# Patient Record
Sex: MALE | Race: WHITE | NOT HISPANIC OR LATINO | URBAN - NONMETROPOLITAN AREA
[De-identification: names, ages, dates, MRNs, and addresses within clinical notes are randomized per-mention and may not be internally consistent; named-entity substitution may affect disease eponyms.]

---

## 2017-01-01 ENCOUNTER — OFFICE VISIT - GICH (OUTPATIENT)
Dept: FAMILY MEDICINE | Facility: OTHER | Age: 77
End: 2017-01-01

## 2017-01-01 ENCOUNTER — COMMUNICATION - GICH (OUTPATIENT)
Dept: FAMILY MEDICINE | Facility: OTHER | Age: 77
End: 2017-01-01

## 2017-01-01 ENCOUNTER — COMMUNICATION - GICH (OUTPATIENT)
Dept: SURGERY | Facility: OTHER | Age: 77
End: 2017-01-01

## 2017-01-01 ENCOUNTER — HISTORY (OUTPATIENT)
Dept: FAMILY MEDICINE | Facility: OTHER | Age: 77
End: 2017-01-01

## 2017-01-01 ENCOUNTER — AMBULATORY - GICH (OUTPATIENT)
Dept: SCHEDULING | Facility: OTHER | Age: 77
End: 2017-01-01

## 2017-01-01 DIAGNOSIS — J44.1 CHRONIC OBSTRUCTIVE PULMONARY DISEASE WITH ACUTE EXACERBATION (H): ICD-10-CM

## 2017-01-01 DIAGNOSIS — F33.9 RECURRENT MAJOR DEPRESSIVE DISORDER (H): ICD-10-CM

## 2017-01-01 DIAGNOSIS — C34.91 MALIGNANT NEOPLASM OF UNSPECIFIED PART OF RIGHT BRONCHUS OR LUNG (H): ICD-10-CM

## 2017-01-01 DIAGNOSIS — J41.1 MUCOPURULENT CHRONIC BRONCHITIS (H): ICD-10-CM

## 2017-01-01 DIAGNOSIS — I10 ESSENTIAL (PRIMARY) HYPERTENSION: ICD-10-CM

## 2017-01-01 DIAGNOSIS — M25.50 PAIN IN JOINT: ICD-10-CM

## 2017-01-01 DIAGNOSIS — J44.9 CHRONIC OBSTRUCTIVE PULMONARY DISEASE (H): ICD-10-CM

## 2017-01-01 DIAGNOSIS — Z12.11 ENCOUNTER FOR SCREENING FOR MALIGNANT NEOPLASM OF COLON: ICD-10-CM

## 2017-01-01 LAB
A/G RATIO - HISTORICAL: 2.4 (ref 1–2)
ABSOLUTE BASOPHILS - HISTORICAL: 0 THOU/CU MM
ABSOLUTE EOSINOPHILS - HISTORICAL: 0.1 THOU/CU MM
ABSOLUTE IMMATURE GRANULOCYTES(METAS,MYELOS,PROS) - HISTORICAL: 0 THOU/CU MM
ABSOLUTE LYMPHOCYTES - HISTORICAL: 0.7 THOU/CU MM (ref 0.9–2.9)
ABSOLUTE MONOCYTES - HISTORICAL: 0.6 THOU/CU MM
ABSOLUTE NEUTROPHILS - HISTORICAL: 6.8 THOU/CU MM (ref 1.7–7)
ALBUMIN SERPL-MCNC: 4.4 G/DL (ref 3.5–5.7)
ALP SERPL-CCNC: 93 IU/L (ref 34–104)
ALT (SGPT) - HISTORICAL: 21 IU/L (ref 7–52)
ANION GAP - HISTORICAL: 18 (ref 5–18)
AST SERPL-CCNC: 23 IU/L (ref 13–39)
BASOPHILS # BLD AUTO: 0.5 %
BILIRUB SERPL-MCNC: 0.7 MG/DL (ref 0.3–1)
BUN SERPL-MCNC: 13 MG/DL (ref 7–25)
BUN/CREAT RATIO - HISTORICAL: 14
CALCIUM SERPL-MCNC: 9.4 MG/DL (ref 8.6–10.3)
CHLORIDE SERPLBLD-SCNC: 96 MMOL/L (ref 98–107)
CO2 SERPL-SCNC: 27 MMOL/L (ref 21–31)
CREAT SERPL-MCNC: 0.94 MG/DL (ref 0.7–1.3)
EOSINOPHIL NFR BLD AUTO: 1.3 %
ERYTHROCYTE [DISTWIDTH] IN BLOOD BY AUTOMATED COUNT: 12.2 % (ref 11.5–15.5)
GFR IF NOT AFRICAN AMERICAN - HISTORICAL: >60 ML/MIN/1.73M2
GLOBULIN - HISTORICAL: 1.8 G/DL (ref 2–3.7)
GLUCOSE SERPL-MCNC: 102 MG/DL (ref 70–105)
HCT VFR BLD AUTO: 46.7 % (ref 37–53)
HEMOGLOBIN: 16 G/DL (ref 13.5–17.5)
IMMATURE GRANULOCYTES(METAS,MYELOS,PROS) - HISTORICAL: 0.5 %
LYMPHOCYTES NFR BLD AUTO: 8.4 % (ref 20–44)
MCH RBC QN AUTO: 34.4 PG (ref 26–34)
MCHC RBC AUTO-ENTMCNC: 34.3 G/DL (ref 32–36)
MCV RBC AUTO: 100 FL (ref 80–100)
MONOCYTES NFR BLD AUTO: 7 %
NEUTROPHILS NFR BLD AUTO: 82.3 % (ref 42–72)
PLATELET # BLD AUTO: 201 THOU/CU MM (ref 140–440)
PMV BLD: 8.9 FL (ref 6.5–11)
POTASSIUM SERPL-SCNC: 4.5 MMOL/L (ref 3.5–5.1)
PROT SERPL-MCNC: 6.2 G/DL (ref 6.4–8.9)
RED BLOOD COUNT - HISTORICAL: 4.65 MIL/CU MM (ref 4.3–5.9)
SODIUM SERPL-SCNC: 141 MMOL/L (ref 133–143)
WHITE BLOOD COUNT - HISTORICAL: 8.2 THOU/CU MM (ref 4.5–11)

## 2017-01-01 ASSESSMENT — PATIENT HEALTH QUESTIONNAIRE - PHQ9
SUM OF ALL RESPONSES TO PHQ QUESTIONS 1-9: 0
SUM OF ALL RESPONSES TO PHQ QUESTIONS 1-9: 0

## 2017-01-10 ENCOUNTER — COMMUNICATION - GICH (OUTPATIENT)
Dept: FAMILY MEDICINE | Facility: OTHER | Age: 77
End: 2017-01-10

## 2017-01-10 DIAGNOSIS — J44.9 CHRONIC OBSTRUCTIVE PULMONARY DISEASE (H): ICD-10-CM

## 2017-01-10 DIAGNOSIS — I10 ESSENTIAL (PRIMARY) HYPERTENSION: ICD-10-CM

## 2017-01-10 DIAGNOSIS — M25.50 PAIN IN JOINT: ICD-10-CM

## 2017-01-10 DIAGNOSIS — F33.9 RECURRENT MAJOR DEPRESSIVE DISORDER (H): ICD-10-CM

## 2017-01-11 ENCOUNTER — HOSPITAL ENCOUNTER (OUTPATIENT)
Dept: RADIOLOGY | Facility: OTHER | Age: 77
End: 2017-01-11
Attending: FAMILY MEDICINE

## 2017-01-11 ENCOUNTER — OFFICE VISIT - GICH (OUTPATIENT)
Dept: FAMILY MEDICINE | Facility: OTHER | Age: 77
End: 2017-01-11

## 2017-01-11 ENCOUNTER — HISTORY (OUTPATIENT)
Dept: FAMILY MEDICINE | Facility: OTHER | Age: 77
End: 2017-01-11

## 2017-01-11 DIAGNOSIS — R19.4 CHANGE IN BOWEL HABITS: ICD-10-CM

## 2017-01-23 ENCOUNTER — AMBULATORY - GICH (OUTPATIENT)
Dept: SCHEDULING | Facility: OTHER | Age: 77
End: 2017-01-23

## 2017-01-27 ENCOUNTER — COMMUNICATION - GICH (OUTPATIENT)
Dept: FAMILY MEDICINE | Facility: OTHER | Age: 77
End: 2017-01-27

## 2017-01-27 DIAGNOSIS — J44.9 CHRONIC OBSTRUCTIVE PULMONARY DISEASE (H): ICD-10-CM

## 2017-01-31 ENCOUNTER — AMBULATORY - GICH (OUTPATIENT)
Dept: SCHEDULING | Facility: OTHER | Age: 77
End: 2017-01-31

## 2017-01-31 ENCOUNTER — AMBULATORY - GICH (OUTPATIENT)
Dept: RADIOLOGY | Facility: OTHER | Age: 77
End: 2017-01-31

## 2017-02-02 ENCOUNTER — AMBULATORY - GICH (OUTPATIENT)
Dept: SCHEDULING | Facility: OTHER | Age: 77
End: 2017-02-02

## 2017-02-02 DIAGNOSIS — C34.91 NON-SMALL CELL LUNG CANCER, RIGHT (H): Primary | ICD-10-CM

## 2017-02-06 ENCOUNTER — COMMUNICATION - GICH (OUTPATIENT)
Dept: FAMILY MEDICINE | Facility: OTHER | Age: 77
End: 2017-02-06

## 2017-02-21 ENCOUNTER — AMBULATORY - GICH (OUTPATIENT)
Dept: RADIOLOGY | Facility: OTHER | Age: 77
End: 2017-02-21

## 2017-02-21 DIAGNOSIS — C34.91 MALIGNANT NEOPLASM OF UNSPECIFIED PART OF RIGHT BRONCHUS OR LUNG (H): ICD-10-CM

## 2017-03-01 ENCOUNTER — HOSPITAL ENCOUNTER (OUTPATIENT)
Dept: RADIOLOGY | Facility: OTHER | Age: 77
End: 2017-03-01

## 2017-03-01 DIAGNOSIS — C34.91 MALIGNANT NEOPLASM OF UNSPECIFIED PART OF RIGHT BRONCHUS OR LUNG (H): ICD-10-CM

## 2017-03-06 ENCOUNTER — AMBULATORY - GICH (OUTPATIENT)
Dept: SCHEDULING | Facility: OTHER | Age: 77
End: 2017-03-06

## 2017-03-15 ENCOUNTER — AMBULATORY - GICH (OUTPATIENT)
Dept: SCHEDULING | Facility: OTHER | Age: 77
End: 2017-03-15

## 2017-12-27 NOTE — PROGRESS NOTES
Patient Information     Patient Name MRN Sex Yuval Isaacs [id number] Male 1940      Progress Notes by Eddie Torres MD at 10/10/2017 12:45 PM     Author:  Eddie Torres MD Service:  (none) Author Type:  Physician     Filed:  10/10/2017  1:04 PM Encounter Date:  10/10/2017 Status:  Signed     :  Eddie Torres MD (Physician)            Nursing Notes:   Mallory Pate  10/10/2017 12:49 PM  Signed  Patient presents to the clinic to get some medications refilled.  Mallory MATY Rio LPN....................10/10/2017 12:42 PM    Yuval Law is a 77 y.o. male who presents for   Chief Complaint     Patient presents with       Medication Management      Refills     HPI: Mr. Law comes in to review his medications; hehas been changing his own BP medications as his BP was symptomatically low in the  range systolic. Now today it is too high. We discussed options and he will stop diltizem totally and resume HYRDROCHLOROTHIAZIDE. He will be cautious about driving , etc. He will watch it closely on home monitor. His COPD is severe requiring OXYGEN with activity and at night. Flovent is used and helps as do proair as needed and spiriva. He needs the proair every day- several times. He has lung cancer as well as COPD.. He is followed in [place] for the cancer. He feels the fluoxetine is helpful and would like to stay on it.   Past Medical History:     Diagnosis  Date     Alcohol use 2015    no withdrawal history      Benign non-nodular prostatic hyperplasia with lower urinary tract symptoms 3/11/2016     Calculus of gallbladder 11/15/2016    S/p CT chest 11/15/2016: Numerous radiodense gallstones.      CAP (community acquired pneumonia) 2016    hosp'd again 2016      CAP (community acquired pneumonia) 2016     COPD (chronic obstructive pulmonary disease) (HC)      Diverticulosis of colon (without mention of hemorrhage)      Glaucoma stage, unspecified(365.70)      Hx  of flexible sigmoidoscopy 02    polyp      Hyperplastic colon polyp     descending colon at 50 cm. and at 25 cm.      Injury of fifth finger of right hand     Table saw injury, repaired by Dr. Stringer      Knee injury     Injured left knee,in  service      Major depressive disorder, recurrent episode, severe, without mention of psychotic behavior      MI, old     15 years ago, slight, per pt      Obesity, unspecified      Pelvic fracture (HC)     3 years ago, slipped on ice      Pulmonary nodule, right 11/15/2016    S/p CT chest 11/15/2016: 2. 2.5 cm pleural-based nodule in the right costophrenic angle sulcus is strongly concerning for neoplasm. PET/CT evaluation may be of benefit in assessing for hypermetabolic activity. The nodule also appears to be approachable for CT-guided biopsy.      Pure hypercholesterolemia      Sebaceous cyst      small umbilical hernia 3/13/2013     Tobacco abuse 7    smoked 1-2 ppd; quit at age 58      Unspecified essential hypertension      Past Surgical History:      Procedure  Laterality Date     CATARACT EXTRACTION Left 8/19/15    Dr Andrade       COLONOSCOPY SCREENING    and07    next due in        hand surgeyr Right     after saw accident       HX VITRECTOMY with Membrane Peel Left 4/10/2014    Dr. Mix       [place] LASER SURGERY OF EYE      for glaucoma       skin cancer surgery      on face, 5 yrs ago       TONSILLECTOMY       Family History       Problem   Relation Age of Onset     Cancer  Mother       of lung cancer at age 84.       Other  Father       in World War II at age 45       Cancer  Brother       of lung cancer       Cancer  Brother       of lung cancer       Current Outpatient Prescriptions       Medication  Sig Dispense Refill     albuterol (PROVENTIL) 0.083 % neb solution Inhale 3 mL via a nebulizer every 4 hours if needed. 1650 mL 3     albuterol HFA (PROAIR HFA) 90 mcg/actuation inhaler Inhale 1-2 Puffs by mouth  "every 4 hours while awake. 3 Inhaler 3     albuterol HFA 90 mcg/actuation inhaler Inhale 1-2 Puffs by mouth every 4 hours if needed (Shortness of Breath or Wheezing).       cholecalciferol (VITAMIN D) 1,000 unit tablet Take 1,000 Units by mouth at bedtime.       diltiazem (CARDIZEM) 120 mg tablet Take 1 tablet by mouth 2 times daily. 180 tablet 3     FLUoxetine (SARAFEM) 20 mg tablet Take 1 tablet by mouth every morning. 90 tablet 0     fluticasone (FLOVENT HFA) 220 mcg/Actuation inhaler Inhale 1 Puff by mouth 2 times daily. 3 Inhaler 4     Gluco Sulfate NaCl-Chond Hamlin Na (GLUCOSAMINE-CHONDROITIN 3X) 750-600 mg tab Take 1 Tab by mouth 2 times daily.       guaiFENesin 400 mg tablet Take 400 mg by mouth 2 times daily.       hydroCHLOROthiazide (HCTZ) 25 mg tablet Take 1 tablet by mouth once daily. 90 tablet 3     meloxicam 15 mg tablet Take 1 tablet by mouth once daily. 90 tablet 3     Nebulizer Accessories SideStream mouthpiece 1 Kit 0     Nebulizer Nebulizer, PORTABLE, neb kit, neb cup and mask.  Medication: albuterol  For home use. Length of need  for Medicare patients: 99 1 Device 0     tiotropium (SPIRIVA) 18 mcg inhalation capsule Inhale 18 mcg by mouth once daily. 90 capsule 3     No current facility-administered medications for this visit.      Medications have been reviewed by me and are current to the best of my knowledge and ability.    Allergies      Allergen   Reactions     Morphine  Emotional Disturbance     Paranoid, \"goofy\" per patient report      Naproxen  Headache     Penicillin G Benzathin,Procain  Hives and Rash     Budesonide-Formoterol  Cough     Cialis [Tadalafil]  Other - Describe In Comment Field     Didn't feel well      Flomax [Tamsulosin]  Other - Describe In Comment Field     Weakness, nasal/head/chest congestion       Levofloxacin  Edema and Ruptured Tendon     Ligament problems      Lisinopril  Hives     Sulfa (Sulfonamide Antibiotics)  *Unknown - Pt Doesn't Remember     Sulindac  " "*Unknown - Pt Doesn't Remember     Tramadol  Insomnia     Mometasone-Formoterol  Intolerance-Can't Take     Throat irritation      Sulfamethoxazole  *Unknown     Patient does not remember          EXAM:   Vitals:        10/10/17 1238 10/10/17 1243 10/10/17 1246 10/10/17 1250   BP:  158/92 160/84    Pulse:  92     SpO2: 93% 97%  (!) 89%   Weight:  101.2 kg (223 lb 3.2 oz)     Height:  1.83 m (6' 0.05\")       General Appearance: Pleasant, alert, appropriate appearance for age. No acute distress  Chest/Respiratory Exam: distant sounds  Cardiovascular Exam: Regular rate and rhythm. S1, S2, no murmur, click, gallop, or rubs. 100 heart rate   ASSESSMENT AND PLAN:  1. HTN (hypertension)  Will stop diltizemand resume HCTZ  - diltiazem (CARDIZEM) 120 mg tablet; Take 1 tablet by mouth 2 times daily.  Dispense: 180 tablet; Refill: 3  - fluticasone (FLOVENT HFA) 220 mcg/Actuation inhaler; Inhale 1 Puff by mouth 2 times daily.  Dispense: 3 Inhaler; Refill: 4  - hydroCHLOROthiazide (HCTZ) 25 mg tablet; Take 1 tablet by mouth once daily.  Dispense: 90 tablet; Refill: 3    2. Episode of recurrent major depressive disorder, unspecified depression episode severity (HC)  stable  - FLUoxetine (SARAFEM) 20 mg tablet; Take 1 tablet by mouth every morning.  Dispense: 90 tablet; Refill: 0    3. Arthralgia, unspecified joint  stable  - meloxicam 15 mg tablet; Take 1 tablet by mouth once daily.  Dispense: 90 tablet; Refill: 3    4. Chronic obstructive pulmonary disease, unspecified COPD type (HC)  Needs OXYGEN for ambulation and at bedtime  - albuterol (PROVENTIL) 0.083 % neb solution; Inhale 3 mL via a nebulizer every 4 hours if needed.  Dispense: 1650 mL; Refill: 3  - albuterol HFA (PROAIR HFA) 90 mcg/actuation inhaler; Inhale 1-2 Puffs by mouth every 4 hours while awake.  Dispense: 3 Inhaler; Refill: 3  - tiotropium (SPIRIVA) 18 mcg inhalation capsule; Inhale 18 mcg by mouth once daily.  Dispense: 90 capsule; Refill: 3                 "

## 2017-12-28 NOTE — TELEPHONE ENCOUNTER
Patient Information     Patient Name MRN Sex Yuval Isaacs [id number] Male 1940      Telephone Encounter by Nuria Jefferson RN at 2017  3:16 PM     Author:  Nuria Jefferson RN Service:  (none) Author Type:  NURS- Registered Nurse     Filed:  2017  3:17 PM Encounter Date:  2017 Status:  Signed     :  Nuria Jefferson RN (NURS- Registered Nurse)            Diuretics (may be prescribed for edema)    Office visit in the past 12 months or per provider note.    Last visit with INOCENCIA ALVAREZ was on: 2017 in [facility]  Next visit with INOCENCIA ALVAREZ is on: No future appointment listed with this provider  Next visit with [facility] is on: No future appointment listed in this department    Lab testing requirements:  Creatinine and Potassium annually, if ordering lab, order BMP.  CREATININE (mg/dL)    Date Value   2016 0.78     POTASSIUM (mmol/L)    Date Value   2016 3.6     BP Readings from Last 4 Encounters:    17 142/74   17 150/82   16 160/92   16 136/60         Review last provider visit note.  If BP reviewed and plan is noted, can refill.  Max refill for 12 months from last office visit or per provider note.  Bronchodilator Inhalers     Office visit in the past 12 months.    Last visit with INOCENCIA ALVAREZ was on: 2017 in [facility]  Next visit with INOCENCIA ALVAREZ is on: No future appointment listed with this provider  Next visit with [facility] is on: No future appointment listed in this department    Max refills 12 months from last office visit.  Prescription refilled per RN Medication Refill Policy.................... NURIA JEFFERSON RN ....................  2017   3:16 PM

## 2017-12-28 NOTE — TELEPHONE ENCOUNTER
Patient Information     Patient Name MRN Sex Yuval Isaacs [id number] Male 1940      Telephone Encounter by Sherrie Og at 2017  1:43 PM     Author:  Sherrie Og Service:  (none) Author Type:  (none)     Filed:  2017  1:44 PM Encounter Date:  2017 Status:  Signed     :  Shrerie Og            Please call patient regarding oxygen concentrator. They need a Provider to authorize medical necessity.

## 2017-12-28 NOTE — ADDENDUM NOTE
Patient Information     Patient Name MRN Yuval Alva [id number] Male 1940      Addendum Note by Eddie Torres MD at 10/10/2017  1:07 PM     Author:  Eddie Torres MD Service:  (none) Author Type:  Physician     Filed:  10/10/2017  1:07 PM Encounter Date:  10/10/2017 Status:  Signed     :  Eddie Torres MD (Physician)       Addended by: EDDIE TORRES on: 10/10/2017 01:07 PM        Modules accepted: Orders

## 2017-12-28 NOTE — TELEPHONE ENCOUNTER
Patient Information     Patient Name MRN Sex Yuval Isaacs [id number] Male 1940      Telephone Encounter by Wanda Pierre RN at 2017  3:19 PM     Author:  Wanda Pierre RN Service:  (none) Author Type:  NURS- Registered Nurse     Filed:  2017  3:28 PM Encounter Date:  2017 Status:  Signed     :  Wanda Pierre RN (NURS- Registered Nurse)            Depression-in adults 18 and over  SSRI    Office visit in the past 12 months or as indicated in chart.  Should have clinic visit 1-2 months after initial prescription.    Last visit with [facility]ENCIA ALVAREZ was on: 2017 in [facility]  Next visit with INOCENCIA ALVAREZ is on: No future appointment listed with this provider  Next visit with Family Practice is on: No future appointment listed in this department    Max refills 12 months from last office visit or per providers notes.    PHQ Depression Screening 2017   Date of PHQ exam (doc flow) 2017   1. Lack of interest/pleasure 0 - Not at all 0 - Not at all   2. Feeling down/depressed 0 - Not at all 0 - Not at all   PHQ-2 TOTAL SCORE 0 0   3. Trouble sleeping - 0 - Not at all   4. Decreased energy - 0 - Not at all   5. Appetite change - 0 - Not at all   6. Feelings of failure - 0 - Not at all   7. Trouble concentrating - 0 - Not at all   8. Activity level - 0 - Not at all   9. Hurting yourself - 0 - Not at all   PHQ-9 TOTAL SCORE - 0   PHQ-9 Severity Level - none   Functional Impairment - -   Some recent data might be hidden          Nsaids    Office visit in the past 12 months or per provider note.    Max refill for 12 months from last office visit or per provider note.    Asthma/COPD    Office visit in the past 12 months.    Max refills 12 months from last office visit.    Calcium Channel Blockers    Office visit in the past 12 months or per provider note.      Review last provider visit note.  If BP reviewed and plan is noted,  can refill.  Max refill for 12 months from last office visit or per provider note.    BP last addressed 11/2016. Patient is due for medication management appointment. Limited refill provided at this time. Tradyo message and/or letter sent for reminder to patient. Prescription refilled per RN Medication Refill Policy.................... Wanda Pierre RN ....................  9/13/2017   3:26 PM

## 2017-12-28 NOTE — TELEPHONE ENCOUNTER
Patient Information     Patient Name MRN Sex Yuval Isaacs [id number] Male 1940      Telephone Encounter by Laisha Mcdaniel at 2017  8:35 AM     Author:  Laisha Mcdaniel Service:  (none) Author Type:  (none)     Filed:  2017  8:43 AM Encounter Date:  2017 Status:  Signed     :  Laisha Mcdaniel            Patient is coming in to be seen next week for Px, as he has not had one for over a year. The oxygen concentrator will be addressed at that time.   Laisha Mcdaniel LPN..............2017 8:43 AM

## 2017-12-28 NOTE — TELEPHONE ENCOUNTER
Patient Information     Patient Name MRN Sex Yuval Isaacs [id number] Male 1940      Telephone Encounter by Laisha Mcdaniel at 2017  9:41 AM     Author:  Laisha Mcdaniel Service:  (none) Author Type:  (none)     Filed:  2017  9:42 AM Encounter Date:  2017 Status:  Signed     :  Laisha Mcdaniel            Patient notified and transferred to scheduling to set up an appointment..  Laisha Mcdaniel LPN..............2017 9:42 AM

## 2017-12-28 NOTE — TELEPHONE ENCOUNTER
Patient Information     Patient Name MRN Yuval Alva [id number] Male 1940      Telephone Encounter by Iglesia Byrne MD at 2017  4:30 PM     Author:  Iglesia Byrne MD Service:  (none) Author Type:  Physician     Filed:  2017  4:30 PM Encounter Date:  2017 Status:  Signed     :  Iglesia Byrne MD (Physician)            Needs to be seen for this.  Iglesia Byrne MD ....................  2017   4:30 PM

## 2017-12-28 NOTE — TELEPHONE ENCOUNTER
Patient Information     Patient Name MRN Sex Yuval Isaacs [id number] Male 1940      Telephone Encounter by Laisha Mcdaniel at 2017  4:06 PM     Author:  Laisha Mcdaniel Service:  (none) Author Type:  (none)     Filed:  2017  4:14 PM Encounter Date:  2017 Status:  Signed     :  Laisha Mcdaniel            Patient is wanting to get a portable oxygen device, and he needs a doctor for the order. He is trying to get this through [facility]. Does he need to be seen? Or is this something that can be ordered, or can even get it?  Laisha Mcdaniel LPN..............2017 4:13 PM

## 2017-12-28 NOTE — PROGRESS NOTES
"Patient Information     Patient Name MRN Sex Yuval Isaacs [id number] Male 1940      Progress Notes by Jeffery De Paz MD at 2017  1:30 PM     Author:  Jeffery De Paz MD Service:  (none) Author Type:  Physician     Filed:  2017  9:01 AM Encounter Date:  2017 Status:  Signed     :  Jeffery De Paz MD (Physician)            SUBJECTIVE:    Yuval Law is a 77 y.o. male who presents for discuss colonoscopy    HPI Comments: Patient arrives here to discuss colonoscopy. He recently received a form letter indicating that he should have his colonoscopy scheduled. Patient recently was diagnosed with lung cancer. He did have the CyberKnife done for his lung cancer. He's doing well. He has non-small cell. Has questions whether he should even proceed with colonoscopy. His last colonoscopy was normal.      Allergies      Allergen   Reactions     Morphine  Emotional Disturbance     Paranoid, \"goofy\" per patient report      Naproxen  Headache     Penicillin G Benzathin,Procain  Hives and Rash     Budesonide-Formoterol  Cough     Cialis [Tadalafil]  Other - Describe In Comment Field     Didn't feel well      Flomax [Tamsulosin]  Other - Describe In Comment Field     Weakness, nasal/head/chest congestion       Levofloxacin  Edema and Ruptured Tendon     Ligament problems      Lisinopril  Hives     Sulfa (Sulfonamide Antibiotics)  *Unknown - Pt Doesn't Remember     Sulindac  *Unknown - Pt Doesn't Remember     Tramadol  Insomnia     Mometasone-Formoterol  Intolerance-Can't Take     Throat irritation      Sulfamethoxazole  *Unknown     Patient does not remember    ,   Family History       Problem   Relation Age of Onset     Cancer  Mother       of lung cancer at age 84.       Other  Father       in World War II at age 45       Cancer  Brother       of lung cancer       Cancer  Brother       of lung cancer     ,   Current Outpatient Prescriptions on File Prior to Visit     "   Medication  Sig Dispense Refill     albuterol (PROVENTIL) 0.083 % neb solution Inhale 3 mL via a nebulizer every 4 hours if needed. 1650 mL 3     albuterol HFA 90 mcg/actuation inhaler Inhale 1-2 Puffs by mouth every 4 hours if needed (Shortness of Breath or Wheezing).       cholecalciferol (VITAMIN D) 1,000 unit tablet Take 1,000 Units by mouth at bedtime.       diltiazem (CARDIZEM) 120 mg tablet Take 1 tablet by mouth two  times daily 180 tablet 2     FLUoxetine (SARAFEM) 20 mg tablet Take 1 tablet by mouth  every morning 90 tablet 2     fluticasone (FLOVENT HFA) 220 mcg/Actuation inhaler Inhale 1 Puff by mouth 2 times daily. 3 Inhaler 4     Gluco Sulfate NaCl-Chond Hamlin Na (GLUCOSAMINE-CHONDROITIN 3X) 750-600 mg tab Take 1 Tab by mouth 2 times daily.       guaiFENesin 400 mg tablet Take 400 mg by mouth 2 times daily.       hydroCHLOROthiazide (HCTZ) 25 mg tablet Take 1 tablet by mouth once daily 90 tablet 1     meloxicam 15 mg tablet Take 1 tablet by mouth once daily 90 tablet 2     Nebulizer Accessories SideStream mouthpiece 1 Kit 0     Nebulizer Nebulizer, PORTABLE, neb kit, neb cup and mask.  Medication: albuterol  For home use. Length of need  for Medicare patients: 99 1 Device 0     PROAIR HFA 90 mcg/actuation inhaler Use 1 to 2 puffs every 4  hours as needed 3 Inhaler 1     SPIRIVA 18 mcg inhalation capsule Inhale the contents of 1  capsule via HandiHaler  daily 90 capsule 2     No current facility-administered medications on file prior to visit.    ,   Past Surgical History:      Procedure  Laterality Date     CATARACT EXTRACTION Left 8/19/15    Dr Andrade       COLONOSCOPY SCREENING   2002 and04/16/07    next due in 2017       hand surgeyr Right     after saw accident       HX VITRECTOMY with Membrane Peel Left 4/10/2014    Dr. Mix       [place] LASER SURGERY OF EYE      for glaucoma       skin cancer surgery      on face, 5 yrs ago       TONSILLECTOMY     ,   Social History        Substance Use Topics           Smoking status:   Former Smoker      Packs/day:  2.00      Types:  Cigarettes      Quit date:  1/1/1999      Smokeless tobacco:   Current User      Types:  Chew      Alcohol use   Yes      Comment: occasional       and   Social History        Substance Use Topics          Smoking status:   Former Smoker      Packs/day:  2.00      Types:  Cigarettes      Quit date:  1/1/1999      Smokeless tobacco:   Current User      Types:  Chew      Alcohol use   Yes      Comment: occasional          REVIEW OF SYSTEMS:  ROS    OBJECTIVE:  /88  Pulse 76  Wt 104.4 kg (230 lb 3.2 oz)  SpO2 96%  BMI 31.22 kg/m2    EXAM:   Physical Exam   Constitutional: He is well-developed, well-nourished, and in no distress.   Pulmonary/Chest: Effort normal and breath sounds normal.   Neurological: He is alert.   Psychiatric: Mood and affect normal.       ASSESSMENT/PLAN:    ICD-10-CM    1. Screening for colon cancer Z12.11         Plan:  I discussed with general surgery. Because of his age and his lung cancer and previous normal colonoscopy patient does not need to proceed with colonoscopy at this time. His currently asymptomatic and not having any stool changes. This was relayed to the patient and he is agreeable. Follow-up when necessary.

## 2017-12-30 NOTE — NURSING NOTE
Patient Information     Patient Name MRN Sex Yuval Isaacs [id number] Male 1940      Nursing Note by Mallory Pate at 10/10/2017 12:45 PM     Author:  Mallory Pate Service:  (none) Author Type:  (none)     Filed:  10/10/2017 12:49 PM Encounter Date:  10/10/2017 Status:  Signed     :  Mallory Pate            Patient presents to the clinic to get some medications refilled.  Mallory Pate LPN....................10/10/2017 12:42 PM

## 2017-12-30 NOTE — NURSING NOTE
Patient Information     Patient Name MRN Sex Yuval Isaacs [id number] Male 1940      Nursing Note by Jing Tracey at 2017  1:30 PM     Author:  Jing Tracey Service:  (none) Author Type:  (none)     Filed:  2017  1:34 PM Encounter Date:  2017 Status:  Signed     :  Jing Tracey            Patient here for consult for colonoscopy. Jing Tracey LPN .......................2017  1:29 PM

## 2018-01-01 ENCOUNTER — TELEPHONE (OUTPATIENT)
Dept: FAMILY MEDICINE | Facility: OTHER | Age: 78
End: 2018-01-01

## 2018-01-01 ENCOUNTER — HOSPITAL ENCOUNTER (EMERGENCY)
Facility: OTHER | Age: 78
Discharge: HOME | End: 2018-03-06
Attending: FAMILY MEDICINE | Admitting: FAMILY MEDICINE
Payer: MEDICARE

## 2018-01-01 ENCOUNTER — DOCUMENTATION ONLY (OUTPATIENT)
Dept: FAMILY MEDICINE | Facility: OTHER | Age: 78
End: 2018-01-01

## 2018-01-01 ENCOUNTER — OFFICE VISIT (OUTPATIENT)
Dept: FAMILY MEDICINE | Facility: OTHER | Age: 78
End: 2018-01-01
Attending: FAMILY MEDICINE
Payer: MEDICARE

## 2018-01-01 ENCOUNTER — HOSPITAL ENCOUNTER (OUTPATIENT)
Dept: GENERAL RADIOLOGY | Facility: OTHER | Age: 78
Discharge: HOME | End: 2018-03-01
Attending: FAMILY MEDICINE | Admitting: FAMILY MEDICINE
Payer: MEDICARE

## 2018-01-01 ENCOUNTER — HISTORY (OUTPATIENT)
Dept: FAMILY MEDICINE | Facility: OTHER | Age: 78
End: 2018-01-01

## 2018-01-01 ENCOUNTER — HOSPITAL ENCOUNTER (OUTPATIENT)
Dept: RADIOLOGY | Facility: OTHER | Age: 78
End: 2018-02-01
Attending: FAMILY MEDICINE

## 2018-01-01 ENCOUNTER — APPOINTMENT (OUTPATIENT)
Dept: GENERAL RADIOLOGY | Facility: OTHER | Age: 78
DRG: 189 | End: 2018-01-01
Attending: EMERGENCY MEDICINE
Payer: MEDICARE

## 2018-01-01 ENCOUNTER — OFFICE VISIT - GICH (OUTPATIENT)
Dept: FAMILY MEDICINE | Facility: OTHER | Age: 78
End: 2018-01-01

## 2018-01-01 ENCOUNTER — APPOINTMENT (OUTPATIENT)
Dept: GENERAL RADIOLOGY | Facility: OTHER | Age: 78
End: 2018-01-01
Attending: FAMILY MEDICINE
Payer: MEDICARE

## 2018-01-01 ENCOUNTER — HOSPITAL ENCOUNTER (INPATIENT)
Facility: OTHER | Age: 78
LOS: 4 days | DRG: 189 | End: 2018-03-21
Attending: EMERGENCY MEDICINE | Admitting: FAMILY MEDICINE
Payer: MEDICARE

## 2018-01-01 ENCOUNTER — HOSPITAL ENCOUNTER (OUTPATIENT)
Dept: RADIOLOGY | Facility: OTHER | Age: 78
End: 2018-01-26
Attending: FAMILY MEDICINE

## 2018-01-01 VITALS
TEMPERATURE: 97.9 F | HEART RATE: 88 BPM | DIASTOLIC BLOOD PRESSURE: 74 MMHG | SYSTOLIC BLOOD PRESSURE: 142 MMHG | BODY MASS INDEX: 33.4 KG/M2 | OXYGEN SATURATION: 93 % | WEIGHT: 246.6 LBS

## 2018-01-01 VITALS
SYSTOLIC BLOOD PRESSURE: 150 MMHG | DIASTOLIC BLOOD PRESSURE: 82 MMHG | WEIGHT: 246 LBS | BODY MASS INDEX: 33.84 KG/M2 | HEART RATE: 72 BPM

## 2018-01-01 VITALS
DIASTOLIC BLOOD PRESSURE: 100 MMHG | WEIGHT: 213 LBS | HEART RATE: 105 BPM | HEIGHT: 72 IN | BODY MASS INDEX: 28.85 KG/M2 | SYSTOLIC BLOOD PRESSURE: 166 MMHG | OXYGEN SATURATION: 97 %

## 2018-01-01 VITALS
BODY MASS INDEX: 28.58 KG/M2 | DIASTOLIC BLOOD PRESSURE: 98 MMHG | RESPIRATION RATE: 30 BRPM | SYSTOLIC BLOOD PRESSURE: 145 MMHG | OXYGEN SATURATION: 94 % | WEIGHT: 211 LBS | TEMPERATURE: 102.1 F | HEIGHT: 72 IN

## 2018-01-01 VITALS
DIASTOLIC BLOOD PRESSURE: 80 MMHG | SYSTOLIC BLOOD PRESSURE: 170 MMHG | WEIGHT: 211.4 LBS | OXYGEN SATURATION: 97 % | HEART RATE: 85 BPM | BODY MASS INDEX: 28.63 KG/M2

## 2018-01-01 VITALS
HEIGHT: 72 IN | TEMPERATURE: 98.2 F | SYSTOLIC BLOOD PRESSURE: 160 MMHG | DIASTOLIC BLOOD PRESSURE: 86 MMHG | WEIGHT: 219 LBS | BODY MASS INDEX: 29.66 KG/M2 | HEART RATE: 119 BPM | OXYGEN SATURATION: 86 %

## 2018-01-01 VITALS
OXYGEN SATURATION: 91 % | DIASTOLIC BLOOD PRESSURE: 79 MMHG | RESPIRATION RATE: 19 BRPM | HEIGHT: 72 IN | SYSTOLIC BLOOD PRESSURE: 119 MMHG | HEART RATE: 110 BPM | BODY MASS INDEX: 28.58 KG/M2 | WEIGHT: 211 LBS | TEMPERATURE: 98.7 F

## 2018-01-01 VITALS
HEART RATE: 60 BPM | WEIGHT: 211 LBS | DIASTOLIC BLOOD PRESSURE: 92 MMHG | BODY MASS INDEX: 28.58 KG/M2 | TEMPERATURE: 97.1 F | SYSTOLIC BLOOD PRESSURE: 150 MMHG

## 2018-01-01 VITALS
WEIGHT: 223.4 LBS | OXYGEN SATURATION: 94 % | BODY MASS INDEX: 30.26 KG/M2 | SYSTOLIC BLOOD PRESSURE: 164 MMHG | HEART RATE: 106 BPM | DIASTOLIC BLOOD PRESSURE: 90 MMHG

## 2018-01-01 VITALS
DIASTOLIC BLOOD PRESSURE: 88 MMHG | BODY MASS INDEX: 31.18 KG/M2 | SYSTOLIC BLOOD PRESSURE: 152 MMHG | HEART RATE: 76 BPM | OXYGEN SATURATION: 96 % | WEIGHT: 230.2 LBS

## 2018-01-01 VITALS
HEART RATE: 92 BPM | OXYGEN SATURATION: 89 % | HEIGHT: 72 IN | BODY MASS INDEX: 30.23 KG/M2 | WEIGHT: 223.2 LBS | DIASTOLIC BLOOD PRESSURE: 84 MMHG | SYSTOLIC BLOOD PRESSURE: 160 MMHG

## 2018-01-01 DIAGNOSIS — C34.91 MALIGNANT NEOPLASM OF UNSPECIFIED PART OF RIGHT BRONCHUS OR LUNG (H): ICD-10-CM

## 2018-01-01 DIAGNOSIS — R10.11 RIGHT UPPER QUADRANT PAIN: ICD-10-CM

## 2018-01-01 DIAGNOSIS — R06.02 SHORTNESS OF BREATH: ICD-10-CM

## 2018-01-01 DIAGNOSIS — J43.1 PANLOBULAR EMPHYSEMA (H): ICD-10-CM

## 2018-01-01 DIAGNOSIS — I10 ESSENTIAL HYPERTENSION, MALIGNANT: ICD-10-CM

## 2018-01-01 DIAGNOSIS — C34.91 ADENOCARCINOMA OF RIGHT LUNG (H): ICD-10-CM

## 2018-01-01 DIAGNOSIS — J10.1 INFLUENZA DUE TO INFLUENZA VIRUS, TYPE A, HUMAN: ICD-10-CM

## 2018-01-01 DIAGNOSIS — Z79.899 ENCOUNTER FOR LONG-TERM (CURRENT) USE OF MEDICATIONS: ICD-10-CM

## 2018-01-01 DIAGNOSIS — J44.1 COPD EXACERBATION (H): ICD-10-CM

## 2018-01-01 DIAGNOSIS — R10.11 RUQ ABDOMINAL PAIN: ICD-10-CM

## 2018-01-01 DIAGNOSIS — E87.6 HYPOKALEMIA: ICD-10-CM

## 2018-01-01 DIAGNOSIS — J18.9 PNEUMONIA, ORGANISM UNSPECIFIED(486): ICD-10-CM

## 2018-01-01 DIAGNOSIS — I25.2 OLD MYOCARDIAL INFARCTION: ICD-10-CM

## 2018-01-01 DIAGNOSIS — Z87.891 PERSONAL HISTORY OF TOBACCO USE, PRESENTING HAZARDS TO HEALTH: ICD-10-CM

## 2018-01-01 DIAGNOSIS — R10.13 EPIGASTRIC PAIN: ICD-10-CM

## 2018-01-01 DIAGNOSIS — J43.0: ICD-10-CM

## 2018-01-01 DIAGNOSIS — R07.89 OTHER CHEST PAIN: Primary | ICD-10-CM

## 2018-01-01 DIAGNOSIS — J18.9 PNEUMONIA OF RIGHT LOWER LOBE DUE TO INFECTIOUS ORGANISM: ICD-10-CM

## 2018-01-01 LAB
A/G RATIO - HISTORICAL: 1.7 (ref 1–2)
ABSOLUTE BASOPHILS - HISTORICAL: 0 THOU/CU MM
ABSOLUTE EOSINOPHILS - HISTORICAL: 0.1 THOU/CU MM
ABSOLUTE IMMATURE GRANULOCYTES(METAS,MYELOS,PROS) - HISTORICAL: 0.1 THOU/CU MM
ABSOLUTE LYMPHOCYTES - HISTORICAL: 1.1 THOU/CU MM (ref 0.9–2.9)
ABSOLUTE MONOCYTES - HISTORICAL: 0.7 THOU/CU MM
ABSOLUTE NEUTROPHILS - HISTORICAL: 5.5 THOU/CU MM (ref 1.7–7)
ALBUMIN SERPL-MCNC: 3.5 G/DL (ref 3.5–5.7)
ALBUMIN SERPL-MCNC: 4.3 G/DL (ref 3.5–5.7)
ALBUMIN SERPL-MCNC: 4.3 G/DL (ref 3.5–5.7)
ALP SERPL-CCNC: 68 U/L (ref 34–104)
ALP SERPL-CCNC: 81 IU/L (ref 34–104)
ALP SERPL-CCNC: 83 U/L (ref 34–104)
ALT (SGPT) - HISTORICAL: 19 IU/L (ref 7–52)
ALT SERPL W P-5'-P-CCNC: 19 U/L (ref 7–52)
ALT SERPL W P-5'-P-CCNC: 22 U/L (ref 7–52)
ANION GAP - HISTORICAL: 9 (ref 5–18)
ANION GAP SERPL CALCULATED.3IONS-SCNC: 10 MMOL/L (ref 3–14)
ANION GAP SERPL CALCULATED.3IONS-SCNC: 2 MMOL/L (ref 3–14)
ANION GAP SERPL CALCULATED.3IONS-SCNC: 3 MMOL/L (ref 3–14)
ANION GAP SERPL CALCULATED.3IONS-SCNC: 4 MMOL/L (ref 3–14)
ANION GAP SERPL CALCULATED.3IONS-SCNC: 8 MMOL/L (ref 3–14)
ANION GAP SERPL CALCULATED.3IONS-SCNC: <1 MMOL/L (ref 3–14)
AST SERPL W P-5'-P-CCNC: 18 U/L (ref 13–39)
AST SERPL W P-5'-P-CCNC: 19 U/L (ref 13–39)
AST SERPL-CCNC: 19 IU/L (ref 13–39)
BACTERIA SPEC CULT: NORMAL
BACTERIA SPEC CULT: NORMAL
BASOPHILS # BLD AUTO: 0 10E9/L (ref 0–0.2)
BASOPHILS # BLD AUTO: 0.5 %
BASOPHILS NFR BLD AUTO: 0.1 %
BASOPHILS NFR BLD AUTO: 0.1 %
BASOPHILS NFR BLD AUTO: 0.2 %
BASOPHILS NFR BLD AUTO: 0.4 %
BILIRUB SERPL-MCNC: 0.4 MG/DL (ref 0.3–1)
BILIRUB SERPL-MCNC: 0.7 MG/DL (ref 0.3–1)
BILIRUB SERPL-MCNC: 0.7 MG/DL (ref 0.3–1)
BUN SERPL-MCNC: 12 MG/DL (ref 7–25)
BUN SERPL-MCNC: 12 MG/DL (ref 7–25)
BUN SERPL-MCNC: 14 MG/DL (ref 7–25)
BUN SERPL-MCNC: 26 MG/DL (ref 7–25)
BUN SERPL-MCNC: 34 MG/DL (ref 7–25)
BUN SERPL-MCNC: 36 MG/DL (ref 7–25)
BUN SERPL-MCNC: 39 MG/DL (ref 7–25)
BUN/CREAT RATIO - HISTORICAL: 14
CALCIUM SERPL-MCNC: 8.5 MG/DL (ref 8.6–10.3)
CALCIUM SERPL-MCNC: 8.6 MG/DL (ref 8.6–10.3)
CALCIUM SERPL-MCNC: 8.6 MG/DL (ref 8.6–10.3)
CALCIUM SERPL-MCNC: 8.9 MG/DL (ref 8.6–10.3)
CALCIUM SERPL-MCNC: 9.1 MG/DL (ref 8.6–10.3)
CALCIUM SERPL-MCNC: 9.1 MG/DL (ref 8.6–10.3)
CALCIUM SERPL-MCNC: 9.4 MG/DL (ref 8.6–10.3)
CHLORIDE SERPL-SCNC: 101 MMOL/L (ref 98–107)
CHLORIDE SERPL-SCNC: 106 MMOL/L (ref 98–107)
CHLORIDE SERPL-SCNC: 106 MMOL/L (ref 98–107)
CHLORIDE SERPL-SCNC: 91 MMOL/L (ref 98–107)
CHLORIDE SERPL-SCNC: 96 MMOL/L (ref 98–107)
CHLORIDE SERPL-SCNC: 97 MMOL/L (ref 98–107)
CHLORIDE SERPLBLD-SCNC: 96 MMOL/L (ref 98–107)
CO2 SERPL-SCNC: 30 MMOL/L (ref 21–31)
CO2 SERPL-SCNC: 30 MMOL/L (ref 21–31)
CO2 SERPL-SCNC: 35 MMOL/L (ref 21–31)
CO2 SERPL-SCNC: 35 MMOL/L (ref 21–31)
CO2 SERPL-SCNC: 38 MMOL/L (ref 21–31)
CO2 SERPL-SCNC: 40 MMOL/L (ref 21–31)
CO2 SERPL-SCNC: 41 MMOL/L (ref 21–31)
CREAT SERPL-MCNC: 0.68 MG/DL (ref 0.7–1.3)
CREAT SERPL-MCNC: 0.74 MG/DL (ref 0.7–1.3)
CREAT SERPL-MCNC: 0.83 MG/DL (ref 0.7–1.3)
CREAT SERPL-MCNC: 0.83 MG/DL (ref 0.7–1.3)
CREAT SERPL-MCNC: 0.93 MG/DL (ref 0.7–1.3)
DIFFERENTIAL METHOD BLD: ABNORMAL
EOSINOPHIL # BLD AUTO: 0 10E9/L (ref 0–0.7)
EOSINOPHIL NFR BLD AUTO: 0 %
EOSINOPHIL NFR BLD AUTO: 0.4 %
EOSINOPHIL NFR BLD AUTO: 1.5 %
ERYTHROCYTE [DISTWIDTH] IN BLOOD BY AUTOMATED COUNT: 12.3 % (ref 11.5–15.5)
ERYTHROCYTE [DISTWIDTH] IN BLOOD BY AUTOMATED COUNT: 13 % (ref 10–15)
ERYTHROCYTE [DISTWIDTH] IN BLOOD BY AUTOMATED COUNT: 13 % (ref 10–15)
ERYTHROCYTE [DISTWIDTH] IN BLOOD BY AUTOMATED COUNT: 13.2 % (ref 10–15)
ERYTHROCYTE [DISTWIDTH] IN BLOOD BY AUTOMATED COUNT: 13.3 % (ref 10–15)
ERYTHROCYTE [DISTWIDTH] IN BLOOD BY AUTOMATED COUNT: 13.3 % (ref 10–15)
ERYTHROCYTE [DISTWIDTH] IN BLOOD BY AUTOMATED COUNT: 13.4 % (ref 10–15)
FLUAV+FLUBV RNA SPEC QL NAA+PROBE: NEGATIVE
FLUAV+FLUBV RNA SPEC QL NAA+PROBE: POSITIVE
GFR IF NOT AFRICAN AMERICAN - HISTORICAL: >60 ML/MIN/1.73M2
GFR SERPL CREATININE-BSD FRML MDRD: 79 ML/MIN/1.7M2
GFR SERPL CREATININE-BSD FRML MDRD: >90 ML/MIN/1.7M2
GLOBULIN - HISTORICAL: 2.5 G/DL (ref 2–3.7)
GLUCOSE SERPL-MCNC: 108 MG/DL (ref 70–105)
GLUCOSE SERPL-MCNC: 112 MG/DL (ref 70–105)
GLUCOSE SERPL-MCNC: 115 MG/DL (ref 70–105)
GLUCOSE SERPL-MCNC: 156 MG/DL (ref 70–105)
GLUCOSE SERPL-MCNC: 162 MG/DL (ref 70–105)
GLUCOSE SERPL-MCNC: 209 MG/DL (ref 70–105)
GLUCOSE SERPL-MCNC: 252 MG/DL (ref 70–105)
HCO3 BLD-SCNC: 34 MMOL/L (ref 22–28)
HCO3 BLD-SCNC: 34 MMOL/L (ref 22–28)
HCO3 BLD-SCNC: 35 MMOL/L (ref 22–28)
HCO3 BLD-SCNC: 35 MMOL/L (ref 22–28)
HCO3 BLD-SCNC: 36 MMOL/L (ref 22–28)
HCO3 BLD-SCNC: 37 MMOL/L (ref 22–28)
HCO3 BLD-SCNC: 38 MMOL/L (ref 22–28)
HCO3 BLD-SCNC: 45 MMOL/L (ref 22–28)
HCT VFR BLD AUTO: 39.8 % (ref 40–53)
HCT VFR BLD AUTO: 40.2 % (ref 40–53)
HCT VFR BLD AUTO: 42.9 % (ref 40–53)
HCT VFR BLD AUTO: 44.2 % (ref 40–53)
HCT VFR BLD AUTO: 44.5 % (ref 40–53)
HCT VFR BLD AUTO: 46.3 % (ref 40–53)
HCT VFR BLD AUTO: 49.5 % (ref 37–53)
HEMOGLOBIN: 17 G/DL (ref 13.5–17.5)
HGB BLD-MCNC: 12.4 G/DL (ref 13.3–17.7)
HGB BLD-MCNC: 12.4 G/DL (ref 13.3–17.7)
HGB BLD-MCNC: 12.6 G/DL (ref 13.3–17.7)
HGB BLD-MCNC: 13.6 G/DL (ref 13.3–17.7)
HGB BLD-MCNC: 14.4 G/DL (ref 13.3–17.7)
HGB BLD-MCNC: 15.6 G/DL (ref 13.3–17.7)
IMM GRANULOCYTES # BLD: 0 10E9/L (ref 0–0.4)
IMM GRANULOCYTES # BLD: 0.1 10E9/L (ref 0–0.4)
IMM GRANULOCYTES # BLD: 0.1 10E9/L (ref 0–0.4)
IMM GRANULOCYTES # BLD: 0.3 10E9/L (ref 0–0.4)
IMM GRANULOCYTES NFR BLD: 0.5 %
IMM GRANULOCYTES NFR BLD: 0.7 %
IMM GRANULOCYTES NFR BLD: 0.7 %
IMM GRANULOCYTES NFR BLD: 1.4 %
IMMATURE GRANULOCYTES(METAS,MYELOS,PROS) - HISTORICAL: 0.7 %
INR PPP: 0.98 (ref 0–1.3)
LACTATE SERPL-SCNC: 0.8 MMOL/L (ref 0.5–2.2)
LYMPHOCYTES # BLD AUTO: 0.5 10E9/L (ref 0.8–5.3)
LYMPHOCYTES # BLD AUTO: 0.8 10E9/L (ref 0.8–5.3)
LYMPHOCYTES NFR BLD AUTO: 14.1 % (ref 20–44)
LYMPHOCYTES NFR BLD AUTO: 2.6 %
LYMPHOCYTES NFR BLD AUTO: 4.1 %
LYMPHOCYTES NFR BLD AUTO: 4.5 %
LYMPHOCYTES NFR BLD AUTO: 6.5 %
MCH RBC QN AUTO: 31.1 PG (ref 26.5–33)
MCH RBC QN AUTO: 31.2 PG (ref 26.5–33)
MCH RBC QN AUTO: 31.4 PG (ref 26.5–33)
MCH RBC QN AUTO: 32 PG (ref 26.5–33)
MCH RBC QN AUTO: 32 PG (ref 26.5–33)
MCH RBC QN AUTO: 32.3 PG (ref 26.5–33)
MCH RBC QN AUTO: 32.7 PG (ref 26–34)
MCHC RBC AUTO-ENTMCNC: 28.9 G/DL (ref 31.5–36.5)
MCHC RBC AUTO-ENTMCNC: 30.8 G/DL (ref 31.5–36.5)
MCHC RBC AUTO-ENTMCNC: 30.8 G/DL (ref 31.5–36.5)
MCHC RBC AUTO-ENTMCNC: 31.7 G/DL (ref 31.5–36.5)
MCHC RBC AUTO-ENTMCNC: 32.4 G/DL (ref 31.5–36.5)
MCHC RBC AUTO-ENTMCNC: 33.7 G/DL (ref 31.5–36.5)
MCHC RBC AUTO-ENTMCNC: 34.3 G/DL (ref 32–36)
MCV RBC AUTO: 101 FL (ref 78–100)
MCV RBC AUTO: 101 FL (ref 78–100)
MCV RBC AUTO: 104 FL (ref 78–100)
MCV RBC AUTO: 108 FL (ref 78–100)
MCV RBC AUTO: 95 FL (ref 80–100)
MCV RBC AUTO: 96 FL (ref 78–100)
MCV RBC AUTO: 97 FL (ref 78–100)
MONOCYTES # BLD AUTO: 0.3 10E9/L (ref 0–1.3)
MONOCYTES # BLD AUTO: 0.7 10E9/L (ref 0–1.3)
MONOCYTES # BLD AUTO: 0.8 10E9/L (ref 0–1.3)
MONOCYTES # BLD AUTO: 1.1 10E9/L (ref 0–1.3)
MONOCYTES NFR BLD AUTO: 10.2 %
MONOCYTES NFR BLD AUTO: 2.2 %
MONOCYTES NFR BLD AUTO: 4 %
MONOCYTES NFR BLD AUTO: 6.8 %
MONOCYTES NFR BLD AUTO: 9.5 %
NEUTROPHILS # BLD AUTO: 12 10E9/L (ref 1.6–8.3)
NEUTROPHILS # BLD AUTO: 14.7 10E9/L (ref 1.6–8.3)
NEUTROPHILS # BLD AUTO: 17 10E9/L (ref 1.6–8.3)
NEUTROPHILS # BLD AUTO: 6.1 10E9/L (ref 1.6–8.3)
NEUTROPHILS NFR BLD AUTO: 73.7 % (ref 42–72)
NEUTROPHILS NFR BLD AUTO: 82 %
NEUTROPHILS NFR BLD AUTO: 87.9 %
NEUTROPHILS NFR BLD AUTO: 91.9 %
NEUTROPHILS NFR BLD AUTO: 92.8 %
NT-PROBNP SERPL-MCNC: 145 PG/ML (ref 0–100)
NT-PROBNP SERPL-MCNC: 162 PG/ML (ref 0–100)
O2/TOTAL GAS SETTING VFR VENT: 40 %
O2/TOTAL GAS SETTING VFR VENT: 50 %
O2/TOTAL GAS SETTING VFR VENT: 50 %
O2/TOTAL GAS SETTING VFR VENT: 55 %
O2/TOTAL GAS SETTING VFR VENT: 60 %
O2/TOTAL GAS SETTING VFR VENT: 8.5 %
O2/TOTAL GAS SETTING VFR VENT: 9 %
O2/TOTAL GAS SETTING VFR VENT: ABNORMAL %
OXYHGB MFR BLD: 84 %
OXYHGB MFR BLD: 90 %
OXYHGB MFR BLD: 92 %
OXYHGB MFR BLD: 93 %
OXYHGB MFR BLD: 94 %
OXYHGB MFR BLD: 95 %
OXYHGB MFR BLD: 96 %
OXYHGB MFR BLD: 96 %
OXYHGB MFR BLD: 97 %
OXYHGB MFR BLD: 98 %
PCO2 BLD: 119 MM HG (ref 35–45)
PCO2 BLD: 70 MM HG (ref 35–45)
PCO2 BLD: 73 MM HG (ref 35–45)
PCO2 BLD: 76 MM HG (ref 35–45)
PCO2 BLD: 79 MM HG (ref 35–45)
PCO2 BLD: 82 MM HG (ref 35–45)
PCO2 BLD: 87 MM HG (ref 35–45)
PCO2 BLD: 88 MM HG (ref 35–45)
PCO2 BLD: 90 MM HG (ref 35–45)
PCO2 BLD: 96 MM HG (ref 35–45)
PH BLD: 7.19 PH (ref 7.35–7.45)
PH BLD: 7.2 PH (ref 7.35–7.45)
PH BLD: 7.21 PH (ref 7.35–7.45)
PH BLD: 7.22 PH (ref 7.35–7.45)
PH BLD: 7.25 PH (ref 7.35–7.45)
PH BLD: 7.27 PH (ref 7.35–7.45)
PH BLD: 7.29 PH (ref 7.35–7.45)
PH BLD: 7.3 PH (ref 7.35–7.45)
PH BLD: 7.3 PH (ref 7.35–7.45)
PH BLD: 7.31 PH (ref 7.35–7.45)
PLATELET # BLD AUTO: 211 10E9/L (ref 150–450)
PLATELET # BLD AUTO: 242 THOU/CU MM (ref 140–440)
PLATELET # BLD AUTO: 300 10E9/L (ref 150–450)
PLATELET # BLD AUTO: 355 10E9/L (ref 150–450)
PLATELET # BLD AUTO: 370 10E9/L (ref 150–450)
PLATELET # BLD AUTO: 383 10E9/L (ref 150–450)
PLATELET # BLD AUTO: 404 10E9/L (ref 150–450)
PMV BLD: 9.4 FL (ref 6.5–11)
PO2 BLD: 102 MM HG (ref 83–108)
PO2 BLD: 138 MM HG (ref 83–108)
PO2 BLD: 143 MM HG (ref 83–108)
PO2 BLD: 53 MM HG (ref 83–108)
PO2 BLD: 62 MM HG (ref 83–108)
PO2 BLD: 76 MM HG (ref 83–108)
PO2 BLD: 82 MM HG (ref 83–108)
PO2 BLD: 91 MM HG (ref 83–108)
PO2 BLD: 93 MM HG (ref 83–108)
PO2 BLD: 99 MM HG (ref 83–108)
POTASSIUM SERPL-SCNC: 3 MMOL/L (ref 3.5–5.1)
POTASSIUM SERPL-SCNC: 3.7 MMOL/L (ref 3.5–5.1)
POTASSIUM SERPL-SCNC: 4.2 MMOL/L (ref 3.5–5.1)
POTASSIUM SERPL-SCNC: 4.4 MMOL/L (ref 3.5–5.1)
POTASSIUM SERPL-SCNC: 4.6 MMOL/L (ref 3.5–5.1)
POTASSIUM SERPL-SCNC: 5.3 MMOL/L (ref 3.5–5.1)
POTASSIUM SERPL-SCNC: 5.9 MMOL/L (ref 3.5–5.1)
PROT SERPL-MCNC: 6.8 G/DL (ref 6.4–8.9)
PROT SERPL-MCNC: 6.8 G/DL (ref 6.4–8.9)
PROT SERPL-MCNC: 6.9 G/DL (ref 6.4–8.9)
RBC # BLD AUTO: 3.87 10E12/L (ref 4.4–5.9)
RBC # BLD AUTO: 3.94 10E12/L (ref 4.4–5.9)
RBC # BLD AUTO: 3.98 10E12/L (ref 4.4–5.9)
RBC # BLD AUTO: 4.38 10E12/L (ref 4.4–5.9)
RBC # BLD AUTO: 4.58 10E12/L (ref 4.4–5.9)
RBC # BLD AUTO: 4.83 10E12/L (ref 4.4–5.9)
RED BLOOD COUNT - HISTORICAL: 5.2 MIL/CU MM (ref 4.3–5.9)
RSV RNA SPEC NAA+PROBE: NEGATIVE
SODIUM SERPL-SCNC: 135 MMOL/L (ref 133–143)
SODIUM SERPL-SCNC: 135 MMOL/L (ref 134–144)
SODIUM SERPL-SCNC: 137 MMOL/L (ref 134–144)
SODIUM SERPL-SCNC: 139 MMOL/L (ref 134–144)
SODIUM SERPL-SCNC: 139 MMOL/L (ref 134–144)
SODIUM SERPL-SCNC: 146 MMOL/L (ref 134–144)
SODIUM SERPL-SCNC: 146 MMOL/L (ref 134–144)
SPECIMEN SOURCE: ABNORMAL
SPECIMEN SOURCE: NORMAL
SPECIMEN SOURCE: NORMAL
TROPONIN I SERPL-MCNC: <0.03 UG/L (ref 0–0.03)
WBC # BLD AUTO: 13 10E9/L (ref 4–11)
WBC # BLD AUTO: 14.7 10E9/L (ref 4–11)
WBC # BLD AUTO: 16.7 10E9/L (ref 4–11)
WBC # BLD AUTO: 18.5 10E9/L (ref 4–11)
WBC # BLD AUTO: 7.4 10E9/L (ref 4–11)
WBC # BLD AUTO: 9.7 10E9/L (ref 4–11)
WHITE BLOOD COUNT - HISTORICAL: 7.5 THOU/CU MM (ref 4.5–11)

## 2018-01-01 PROCEDURE — 85025 COMPLETE CBC W/AUTO DIFF WBC: CPT | Performed by: FAMILY MEDICINE

## 2018-01-01 PROCEDURE — 99222 1ST HOSP IP/OBS MODERATE 55: CPT | Mod: AI | Performed by: FAMILY MEDICINE

## 2018-01-01 PROCEDURE — 25000125 ZZHC RX 250: Performed by: FAMILY MEDICINE

## 2018-01-01 PROCEDURE — 93005 ELECTROCARDIOGRAM TRACING: CPT

## 2018-01-01 PROCEDURE — 99232 SBSQ HOSP IP/OBS MODERATE 35: CPT | Performed by: FAMILY MEDICINE

## 2018-01-01 PROCEDURE — 40000275 ZZH STATISTIC RCP TIME EA 10 MIN

## 2018-01-01 PROCEDURE — 94640 AIRWAY INHALATION TREATMENT: CPT | Mod: 76

## 2018-01-01 PROCEDURE — 83605 ASSAY OF LACTIC ACID: CPT | Performed by: EMERGENCY MEDICINE

## 2018-01-01 PROCEDURE — 82805 BLOOD GASES W/O2 SATURATION: CPT | Performed by: FAMILY MEDICINE

## 2018-01-01 PROCEDURE — 25000132 ZZH RX MED GY IP 250 OP 250 PS 637: Mod: GY | Performed by: FAMILY MEDICINE

## 2018-01-01 PROCEDURE — 80048 BASIC METABOLIC PNL TOTAL CA: CPT | Performed by: FAMILY MEDICINE

## 2018-01-01 PROCEDURE — 40000270 ZZH STATISTIC OXYGEN  O2DAILY TECH TIME

## 2018-01-01 PROCEDURE — 82805 BLOOD GASES W/O2 SATURATION: CPT | Performed by: EMERGENCY MEDICINE

## 2018-01-01 PROCEDURE — 71046 X-RAY EXAM CHEST 2 VIEWS: CPT

## 2018-01-01 PROCEDURE — 36600 WITHDRAWAL OF ARTERIAL BLOOD: CPT | Performed by: FAMILY MEDICINE

## 2018-01-01 PROCEDURE — 25000128 H RX IP 250 OP 636: Performed by: EMERGENCY MEDICINE

## 2018-01-01 PROCEDURE — 84484 ASSAY OF TROPONIN QUANT: CPT | Performed by: FAMILY MEDICINE

## 2018-01-01 PROCEDURE — 83880 ASSAY OF NATRIURETIC PEPTIDE: CPT | Performed by: FAMILY MEDICINE

## 2018-01-01 PROCEDURE — 94660 CPAP INITIATION&MGMT: CPT

## 2018-01-01 PROCEDURE — 36415 COLL VENOUS BLD VENIPUNCTURE: CPT | Performed by: EMERGENCY MEDICINE

## 2018-01-01 PROCEDURE — 20000006 ZZH R&B ICU - GICH

## 2018-01-01 PROCEDURE — 87040 BLOOD CULTURE FOR BACTERIA: CPT | Performed by: EMERGENCY MEDICINE

## 2018-01-01 PROCEDURE — 93010 ELECTROCARDIOGRAM REPORT: CPT | Performed by: INTERNAL MEDICINE

## 2018-01-01 PROCEDURE — 25000128 H RX IP 250 OP 636: Performed by: FAMILY MEDICINE

## 2018-01-01 PROCEDURE — 96375 TX/PRO/DX INJ NEW DRUG ADDON: CPT

## 2018-01-01 PROCEDURE — 5A09457 ASSISTANCE WITH RESPIRATORY VENTILATION, 24-96 CONSECUTIVE HOURS, CONTINUOUS POSITIVE AIRWAY PRESSURE: ICD-10-PCS | Performed by: FAMILY MEDICINE

## 2018-01-01 PROCEDURE — 99285 EMERGENCY DEPT VISIT HI MDM: CPT | Mod: 25 | Performed by: EMERGENCY MEDICINE

## 2018-01-01 PROCEDURE — 99285 EMERGENCY DEPT VISIT HI MDM: CPT | Mod: 25 | Performed by: FAMILY MEDICINE

## 2018-01-01 PROCEDURE — 36600 WITHDRAWAL OF ARTERIAL BLOOD: CPT | Performed by: EMERGENCY MEDICINE

## 2018-01-01 PROCEDURE — 36415 COLL VENOUS BLD VENIPUNCTURE: CPT | Performed by: FAMILY MEDICINE

## 2018-01-01 PROCEDURE — 25000125 ZZHC RX 250: Performed by: EMERGENCY MEDICINE

## 2018-01-01 PROCEDURE — 99284 EMERGENCY DEPT VISIT MOD MDM: CPT | Mod: Z6 | Performed by: FAMILY MEDICINE

## 2018-01-01 PROCEDURE — 99213 OFFICE O/P EST LOW 20 MIN: CPT | Performed by: FAMILY MEDICINE

## 2018-01-01 PROCEDURE — 85027 COMPLETE CBC AUTOMATED: CPT | Performed by: FAMILY MEDICINE

## 2018-01-01 PROCEDURE — 99285 EMERGENCY DEPT VISIT HI MDM: CPT | Mod: Z6 | Performed by: EMERGENCY MEDICINE

## 2018-01-01 PROCEDURE — 87631 RESP VIRUS 3-5 TARGETS: CPT | Performed by: FAMILY MEDICINE

## 2018-01-01 PROCEDURE — 85025 COMPLETE CBC W/AUTO DIFF WBC: CPT | Performed by: EMERGENCY MEDICINE

## 2018-01-01 PROCEDURE — 93010 ELECTROCARDIOGRAM REPORT: CPT | Mod: 76 | Performed by: INTERNAL MEDICINE

## 2018-01-01 PROCEDURE — A9270 NON-COVERED ITEM OR SERVICE: HCPCS | Mod: GY | Performed by: EMERGENCY MEDICINE

## 2018-01-01 PROCEDURE — A9270 NON-COVERED ITEM OR SERVICE: HCPCS | Mod: GY | Performed by: FAMILY MEDICINE

## 2018-01-01 PROCEDURE — 80053 COMPREHEN METABOLIC PANEL: CPT | Performed by: FAMILY MEDICINE

## 2018-01-01 PROCEDURE — 93005 ELECTROCARDIOGRAM TRACING: CPT | Performed by: FAMILY MEDICINE

## 2018-01-01 PROCEDURE — 96365 THER/PROPH/DIAG IV INF INIT: CPT

## 2018-01-01 PROCEDURE — G0463 HOSPITAL OUTPT CLINIC VISIT: HCPCS | Mod: 25

## 2018-01-01 PROCEDURE — 25000128 H RX IP 250 OP 636: Performed by: INTERNAL MEDICINE

## 2018-01-01 PROCEDURE — 99239 HOSP IP/OBS DSCHRG MGMT >30: CPT | Performed by: FAMILY MEDICINE

## 2018-01-01 PROCEDURE — 94640 AIRWAY INHALATION TREATMENT: CPT

## 2018-01-01 PROCEDURE — 99233 SBSQ HOSP IP/OBS HIGH 50: CPT | Performed by: FAMILY MEDICINE

## 2018-01-01 PROCEDURE — G0463 HOSPITAL OUTPT CLINIC VISIT: HCPCS

## 2018-01-01 PROCEDURE — 25000132 ZZH RX MED GY IP 250 OP 250 PS 637: Mod: GY | Performed by: EMERGENCY MEDICINE

## 2018-01-01 PROCEDURE — 85610 PROTHROMBIN TIME: CPT | Performed by: FAMILY MEDICINE

## 2018-01-01 PROCEDURE — 80053 COMPREHEN METABOLIC PANEL: CPT | Performed by: EMERGENCY MEDICINE

## 2018-01-01 PROCEDURE — 87040 BLOOD CULTURE FOR BACTERIA: CPT | Performed by: FAMILY MEDICINE

## 2018-01-01 PROCEDURE — 25800025 ZZH RX 258: Performed by: FAMILY MEDICINE

## 2018-01-01 RX ORDER — LORAZEPAM 2 MG/ML
.5-1 INJECTION INTRAMUSCULAR EVERY 4 HOURS PRN
Status: DISCONTINUED | OUTPATIENT
Start: 2018-01-01 | End: 2018-01-01 | Stop reason: HOSPADM

## 2018-01-01 RX ORDER — ACETAMINOPHEN 325 MG/1
325 TABLET ORAL ONCE
Status: DISCONTINUED | OUTPATIENT
Start: 2018-01-01 | End: 2018-01-01

## 2018-01-01 RX ORDER — ALBUTEROL SULFATE 0.83 MG/ML
3 SOLUTION RESPIRATORY (INHALATION)
Status: DISCONTINUED | OUTPATIENT
Start: 2018-01-01 | End: 2018-01-01 | Stop reason: HOSPADM

## 2018-01-01 RX ORDER — FLUTICASONE PROPIONATE 220 UG/1
1 AEROSOL, METERED RESPIRATORY (INHALATION) 2 TIMES DAILY
COMMUNITY
Start: 2017-01-01

## 2018-01-01 RX ORDER — CEFTRIAXONE SODIUM 1 G/50ML
1 INJECTION, SOLUTION INTRAVENOUS ONCE
Status: COMPLETED | OUTPATIENT
Start: 2018-01-01 | End: 2018-01-01

## 2018-01-01 RX ORDER — ACETAMINOPHEN 325 MG/1
975 TABLET ORAL ONCE
Status: COMPLETED | OUTPATIENT
Start: 2018-01-01 | End: 2018-01-01

## 2018-01-01 RX ORDER — METHYLPREDNISOLONE SODIUM SUCCINATE 125 MG/2ML
80 INJECTION, POWDER, LYOPHILIZED, FOR SOLUTION INTRAMUSCULAR; INTRAVENOUS 3 TIMES DAILY
Status: DISCONTINUED | OUTPATIENT
Start: 2018-01-01 | End: 2018-01-01

## 2018-01-01 RX ORDER — GUAIFENESIN 400 MG/1
400 TABLET ORAL 2 TIMES DAILY
COMMUNITY

## 2018-01-01 RX ORDER — ALBUTEROL SULFATE 90 UG/1
1-2 AEROSOL, METERED RESPIRATORY (INHALATION) EVERY 4 HOURS PRN
COMMUNITY
End: 2018-01-01

## 2018-01-01 RX ORDER — ACETAMINOPHEN 325 MG/1
650 TABLET ORAL ONCE
Status: COMPLETED | OUTPATIENT
Start: 2018-01-01 | End: 2018-01-01

## 2018-01-01 RX ORDER — MORPHINE SULFATE 2 MG/ML
1-2 INJECTION, SOLUTION INTRAMUSCULAR; INTRAVENOUS
Status: DISCONTINUED | OUTPATIENT
Start: 2018-01-01 | End: 2018-01-01

## 2018-01-01 RX ORDER — MORPHINE SULFATE 100 MG/5ML
5-10 SOLUTION ORAL
Status: DISCONTINUED | OUTPATIENT
Start: 2018-01-01 | End: 2018-01-01 | Stop reason: HOSPADM

## 2018-01-01 RX ORDER — NALOXONE HYDROCHLORIDE 0.4 MG/ML
.1-.4 INJECTION, SOLUTION INTRAMUSCULAR; INTRAVENOUS; SUBCUTANEOUS
Status: DISCONTINUED | OUTPATIENT
Start: 2018-01-01 | End: 2018-01-01

## 2018-01-01 RX ORDER — TIOTROPIUM BROMIDE 18 UG/1
18 CAPSULE ORAL; RESPIRATORY (INHALATION) DAILY
COMMUNITY
Start: 2017-01-01

## 2018-01-01 RX ORDER — FLUOXETINE 20 MG/1
1 TABLET, FILM COATED ORAL EVERY MORNING
COMMUNITY
Start: 2017-01-01 | End: 2018-01-01

## 2018-01-01 RX ORDER — DOCUSATE SODIUM 100 MG/1
100 CAPSULE, LIQUID FILLED ORAL 2 TIMES DAILY
Status: DISCONTINUED | OUTPATIENT
Start: 2018-01-01 | End: 2018-01-01 | Stop reason: HOSPADM

## 2018-01-01 RX ORDER — DILTIAZEM HYDROCHLORIDE 120 MG/1
1 TABLET, FILM COATED ORAL 2 TIMES DAILY
COMMUNITY
Start: 2017-01-01 | End: 2018-01-01

## 2018-01-01 RX ORDER — ALBUTEROL SULFATE 0.83 MG/ML
2.5 SOLUTION RESPIRATORY (INHALATION) ONCE
Status: COMPLETED | OUTPATIENT
Start: 2018-01-01 | End: 2018-01-01

## 2018-01-01 RX ORDER — HYDROCHLOROTHIAZIDE 25 MG/1
1 TABLET ORAL DAILY
Status: ON HOLD | COMMUNITY
Start: 2017-01-01 | End: 2018-01-01

## 2018-01-01 RX ORDER — ACETAMINOPHEN 650 MG/1
650 SUPPOSITORY RECTAL EVERY 6 HOURS PRN
Status: DISCONTINUED | OUTPATIENT
Start: 2018-01-01 | End: 2018-01-01 | Stop reason: HOSPADM

## 2018-01-01 RX ORDER — CEFTRIAXONE 2 G/1
2 INJECTION, POWDER, FOR SOLUTION INTRAMUSCULAR; INTRAVENOUS ONCE
Status: DISCONTINUED | OUTPATIENT
Start: 2018-01-01 | End: 2018-01-01 | Stop reason: CLARIF

## 2018-01-01 RX ORDER — HALOPERIDOL 5 MG/ML
5 INJECTION INTRAMUSCULAR ONCE
Status: COMPLETED | OUTPATIENT
Start: 2018-01-01 | End: 2018-01-01

## 2018-01-01 RX ORDER — HYDROCHLOROTHIAZIDE 25 MG/1
25 TABLET ORAL DAILY
Status: DISCONTINUED | OUTPATIENT
Start: 2018-01-01 | End: 2018-01-01

## 2018-01-01 RX ORDER — LORAZEPAM 2 MG/ML
.5-1 INJECTION INTRAMUSCULAR EVERY 6 HOURS PRN
Status: DISCONTINUED | OUTPATIENT
Start: 2018-01-01 | End: 2018-01-01

## 2018-01-01 RX ORDER — MELOXICAM 15 MG/1
1 TABLET ORAL DAILY
COMMUNITY
Start: 2017-01-01 | End: 2018-01-01

## 2018-01-01 RX ORDER — IPRATROPIUM BROMIDE AND ALBUTEROL SULFATE 2.5; .5 MG/3ML; MG/3ML
3 SOLUTION RESPIRATORY (INHALATION) ONCE
Status: COMPLETED | OUTPATIENT
Start: 2018-01-01 | End: 2018-01-01

## 2018-01-01 RX ORDER — ALBUTEROL SULFATE 0.83 MG/ML
3 SOLUTION RESPIRATORY (INHALATION) EVERY 4 HOURS PRN
COMMUNITY
Start: 2017-01-01

## 2018-01-01 RX ORDER — TIOTROPIUM BROMIDE 18 UG/1
18 CAPSULE ORAL; RESPIRATORY (INHALATION) DAILY
Status: DISCONTINUED | OUTPATIENT
Start: 2018-01-01 | End: 2018-01-01

## 2018-01-01 RX ORDER — HYDROCODONE BITARTRATE AND ACETAMINOPHEN 5; 325 MG/1; MG/1
1-2 TABLET ORAL EVERY 4 HOURS PRN
Qty: 20 TABLET | Refills: 0 | Status: SHIPPED | OUTPATIENT
Start: 2018-01-01 | End: 2018-01-01

## 2018-01-01 RX ORDER — ONDANSETRON 2 MG/ML
4 INJECTION INTRAMUSCULAR; INTRAVENOUS EVERY 6 HOURS PRN
Status: DISCONTINUED | OUTPATIENT
Start: 2018-01-01 | End: 2018-01-01 | Stop reason: HOSPADM

## 2018-01-01 RX ORDER — SODIUM CHLORIDE AND POTASSIUM CHLORIDE 150; 900 MG/100ML; MG/100ML
INJECTION, SOLUTION INTRAVENOUS CONTINUOUS
Status: DISCONTINUED | OUTPATIENT
Start: 2018-01-01 | End: 2018-01-01

## 2018-01-01 RX ORDER — PREDNISONE 20 MG/1
60 TABLET ORAL DAILY
Status: DISCONTINUED | OUTPATIENT
Start: 2018-01-01 | End: 2018-01-01

## 2018-01-01 RX ORDER — METOPROLOL TARTRATE 1 MG/ML
5 INJECTION, SOLUTION INTRAVENOUS
Status: COMPLETED | OUTPATIENT
Start: 2018-01-01 | End: 2018-01-01

## 2018-01-01 RX ORDER — CEFTRIAXONE 2 G/1
2 INJECTION, POWDER, FOR SOLUTION INTRAMUSCULAR; INTRAVENOUS EVERY 24 HOURS
Status: DISCONTINUED | OUTPATIENT
Start: 2018-01-01 | End: 2018-01-01

## 2018-01-01 RX ORDER — METHYLPREDNISOLONE SODIUM SUCCINATE 125 MG/2ML
125 INJECTION, POWDER, LYOPHILIZED, FOR SOLUTION INTRAMUSCULAR; INTRAVENOUS ONCE
Status: COMPLETED | OUTPATIENT
Start: 2018-01-01 | End: 2018-01-01

## 2018-01-01 RX ORDER — FLUTICASONE PROPIONATE 110 UG/1
2 AEROSOL, METERED RESPIRATORY (INHALATION) 2 TIMES DAILY
Status: DISCONTINUED | OUTPATIENT
Start: 2018-01-01 | End: 2018-01-01

## 2018-01-01 RX ORDER — PREDNISONE 20 MG/1
TABLET ORAL
COMMUNITY
Start: 2017-01-01 | End: 2018-01-01

## 2018-01-01 RX ORDER — IPRATROPIUM BROMIDE AND ALBUTEROL SULFATE 2.5; .5 MG/3ML; MG/3ML
3 SOLUTION RESPIRATORY (INHALATION) EVERY 4 HOURS
Status: DISCONTINUED | OUTPATIENT
Start: 2018-01-01 | End: 2018-01-01

## 2018-01-01 RX ORDER — POLYVINYL ALCOHOL 14 MG/ML
2 SOLUTION/ DROPS OPHTHALMIC EVERY 8 HOURS PRN
Status: DISCONTINUED | OUTPATIENT
Start: 2018-01-01 | End: 2018-01-01 | Stop reason: HOSPADM

## 2018-01-01 RX ORDER — OSELTAMIVIR PHOSPHATE 75 MG/1
75 CAPSULE ORAL 2 TIMES DAILY
Qty: 10 CAPSULE | Refills: 0 | Status: SHIPPED | OUTPATIENT
Start: 2018-01-01 | End: 2018-01-01

## 2018-01-01 RX ORDER — ONDANSETRON 4 MG/1
4 TABLET, ORALLY DISINTEGRATING ORAL EVERY 6 HOURS PRN
Status: DISCONTINUED | OUTPATIENT
Start: 2018-01-01 | End: 2018-01-01 | Stop reason: HOSPADM

## 2018-01-01 RX ORDER — POTASSIUM CHLORIDE 1500 MG/1
20 TABLET, EXTENDED RELEASE ORAL 2 TIMES DAILY
COMMUNITY
Start: 2018-01-01

## 2018-01-01 RX ORDER — AZITHROMYCIN 500 MG/5ML
500 INJECTION, POWDER, LYOPHILIZED, FOR SOLUTION INTRAVENOUS EVERY 24 HOURS
Status: DISCONTINUED | OUTPATIENT
Start: 2018-01-01 | End: 2018-01-01

## 2018-01-01 RX ORDER — ALBUTEROL SULFATE 90 UG/1
1-2 AEROSOL, METERED RESPIRATORY (INHALATION)
COMMUNITY
Start: 2017-01-01

## 2018-01-01 RX ORDER — METHYLPREDNISOLONE SODIUM SUCCINATE 125 MG/2ML
125 INJECTION, POWDER, LYOPHILIZED, FOR SOLUTION INTRAMUSCULAR; INTRAVENOUS EVERY 6 HOURS
Status: DISCONTINUED | OUTPATIENT
Start: 2018-01-01 | End: 2018-01-01 | Stop reason: CLARIF

## 2018-01-01 RX ORDER — MAGNESIUM CARB/ALUMINUM HYDROX 105-160MG
TABLET,CHEWABLE ORAL
Status: ON HOLD | COMMUNITY
End: 2018-01-01

## 2018-01-01 RX ORDER — ATROPINE SULFATE 10 MG/ML
1-2 SOLUTION/ DROPS OPHTHALMIC
Status: DISCONTINUED | OUTPATIENT
Start: 2018-01-01 | End: 2018-01-01 | Stop reason: HOSPADM

## 2018-01-01 RX ORDER — MORPHINE SULFATE 2 MG/ML
1-2 INJECTION, SOLUTION INTRAMUSCULAR; INTRAVENOUS
Status: DISCONTINUED | OUTPATIENT
Start: 2018-01-01 | End: 2018-01-01 | Stop reason: HOSPADM

## 2018-01-01 RX ORDER — PREDNISONE 20 MG/1
20 TABLET ORAL DAILY
Qty: 5 TABLET | Refills: 0 | Status: SHIPPED | OUTPATIENT
Start: 2018-01-01 | End: 2018-01-01

## 2018-01-01 RX ORDER — METOPROLOL TARTRATE 1 MG/ML
5 INJECTION, SOLUTION INTRAVENOUS EVERY 4 HOURS PRN
Status: COMPLETED | OUTPATIENT
Start: 2018-01-01 | End: 2018-01-01

## 2018-01-01 RX ADMIN — MORPHINE SULFATE 2 MG: 2 INJECTION, SOLUTION INTRAMUSCULAR; INTRAVENOUS at 21:59

## 2018-01-01 RX ADMIN — IPRATROPIUM BROMIDE AND ALBUTEROL SULFATE 3 ML: .5; 3 SOLUTION RESPIRATORY (INHALATION) at 06:06

## 2018-01-01 RX ADMIN — CEFTRIAXONE SODIUM 1 G: 1 INJECTION, SOLUTION INTRAVENOUS at 11:52

## 2018-01-01 RX ADMIN — MORPHINE SULFATE 2 MG: 2 INJECTION, SOLUTION INTRAMUSCULAR; INTRAVENOUS at 02:42

## 2018-01-01 RX ADMIN — TIOTROPIUM BROMIDE 18 MCG: 18 CAPSULE ORAL; RESPIRATORY (INHALATION) at 06:08

## 2018-01-01 RX ADMIN — ALBUTEROL SULFATE 2.5 MG: 2.5 SOLUTION RESPIRATORY (INHALATION) at 08:55

## 2018-01-01 RX ADMIN — HALOPERIDOL LACTATE 5 MG: 5 INJECTION, SOLUTION INTRAMUSCULAR at 18:36

## 2018-01-01 RX ADMIN — AZITHROMYCIN MONOHYDRATE 500 MG: 500 INJECTION, POWDER, LYOPHILIZED, FOR SOLUTION INTRAVENOUS at 10:54

## 2018-01-01 RX ADMIN — CEFTRIAXONE SODIUM 2 G: 1 INJECTION, POWDER, FOR SOLUTION INTRAMUSCULAR; INTRAVENOUS at 09:04

## 2018-01-01 RX ADMIN — IPRATROPIUM BROMIDE AND ALBUTEROL SULFATE 3 ML: .5; 3 SOLUTION RESPIRATORY (INHALATION) at 22:43

## 2018-01-01 RX ADMIN — METOPROLOL TARTRATE 5 MG: 5 INJECTION INTRAVENOUS at 08:00

## 2018-01-01 RX ADMIN — PREDNISONE 60 MG: 20 TABLET ORAL at 09:03

## 2018-01-01 RX ADMIN — MORPHINE SULFATE 2 MG: 2 INJECTION, SOLUTION INTRAMUSCULAR; INTRAVENOUS at 06:31

## 2018-01-01 RX ADMIN — METHYLPREDNISOLONE SODIUM SUCCINATE 125 MG: 125 INJECTION, POWDER, FOR SOLUTION INTRAMUSCULAR; INTRAVENOUS at 09:14

## 2018-01-01 RX ADMIN — IPRATROPIUM BROMIDE AND ALBUTEROL SULFATE 3 ML: .5; 3 SOLUTION RESPIRATORY (INHALATION) at 18:39

## 2018-01-01 RX ADMIN — CEFTRIAXONE SODIUM 2 G: 2 INJECTION, POWDER, FOR SOLUTION INTRAMUSCULAR; INTRAVENOUS at 09:54

## 2018-01-01 RX ADMIN — METHYLPREDNISOLONE SODIUM SUCCINATE 81.25 MG: 125 INJECTION, POWDER, FOR SOLUTION INTRAMUSCULAR; INTRAVENOUS at 15:13

## 2018-01-01 RX ADMIN — ENOXAPARIN SODIUM 40 MG: 40 INJECTION SUBCUTANEOUS at 19:36

## 2018-01-01 RX ADMIN — IPRATROPIUM BROMIDE AND ALBUTEROL SULFATE 3 ML: .5; 3 SOLUTION RESPIRATORY (INHALATION) at 02:11

## 2018-01-01 RX ADMIN — LORAZEPAM 1 MG: 2 INJECTION, SOLUTION INTRAMUSCULAR; INTRAVENOUS at 08:20

## 2018-01-01 RX ADMIN — METHYLPREDNISOLONE SODIUM SUCCINATE 81.25 MG: 125 INJECTION, POWDER, FOR SOLUTION INTRAMUSCULAR; INTRAVENOUS at 22:02

## 2018-01-01 RX ADMIN — TIOTROPIUM BROMIDE 18 MCG: 18 CAPSULE ORAL; RESPIRATORY (INHALATION) at 09:55

## 2018-01-01 RX ADMIN — DEXTROSE MONOHYDRATE AND SODIUM CHLORIDE 75 ML/HR: 5; .45 INJECTION, SOLUTION INTRAVENOUS at 22:15

## 2018-01-01 RX ADMIN — IPRATROPIUM BROMIDE AND ALBUTEROL SULFATE 3 ML: .5; 3 SOLUTION RESPIRATORY (INHALATION) at 02:00

## 2018-01-01 RX ADMIN — METHYLPREDNISOLONE SODIUM SUCCINATE 125 MG: 125 INJECTION, POWDER, FOR SOLUTION INTRAMUSCULAR; INTRAVENOUS at 20:20

## 2018-01-01 RX ADMIN — POTASSIUM CHLORIDE AND SODIUM CHLORIDE: 900; 150 INJECTION, SOLUTION INTRAVENOUS at 16:01

## 2018-01-01 RX ADMIN — IPRATROPIUM BROMIDE AND ALBUTEROL SULFATE 3 ML: .5; 3 SOLUTION RESPIRATORY (INHALATION) at 18:01

## 2018-01-01 RX ADMIN — METOPROLOL TARTRATE 5 MG: 5 INJECTION INTRAVENOUS at 21:13

## 2018-01-01 RX ADMIN — TIOTROPIUM BROMIDE 18 MCG: 18 CAPSULE ORAL; RESPIRATORY (INHALATION) at 06:09

## 2018-01-01 RX ADMIN — ENOXAPARIN SODIUM 40 MG: 40 INJECTION SUBCUTANEOUS at 21:59

## 2018-01-01 RX ADMIN — MORPHINE SULFATE 2 MG: 2 INJECTION, SOLUTION INTRAMUSCULAR; INTRAVENOUS at 18:56

## 2018-01-01 RX ADMIN — ENOXAPARIN SODIUM 40 MG: 40 INJECTION SUBCUTANEOUS at 20:18

## 2018-01-01 RX ADMIN — IPRATROPIUM BROMIDE AND ALBUTEROL SULFATE 3 ML: .5; 3 SOLUTION RESPIRATORY (INHALATION) at 23:55

## 2018-01-01 RX ADMIN — IPRATROPIUM BROMIDE AND ALBUTEROL SULFATE 3 ML: .5; 3 SOLUTION RESPIRATORY (INHALATION) at 15:20

## 2018-01-01 RX ADMIN — IPRATROPIUM BROMIDE AND ALBUTEROL SULFATE 3 ML: .5; 3 SOLUTION RESPIRATORY (INHALATION) at 14:23

## 2018-01-01 RX ADMIN — ACETAMINOPHEN 650 MG: 325 TABLET, FILM COATED ORAL at 09:54

## 2018-01-01 RX ADMIN — IPRATROPIUM BROMIDE AND ALBUTEROL SULFATE 3 ML: .5; 3 SOLUTION RESPIRATORY (INHALATION) at 18:08

## 2018-01-01 RX ADMIN — MORPHINE SULFATE 2 MG: 2 INJECTION, SOLUTION INTRAMUSCULAR; INTRAVENOUS at 01:47

## 2018-01-01 RX ADMIN — AZITHROMYCIN MONOHYDRATE 500 MG: 500 INJECTION, POWDER, LYOPHILIZED, FOR SOLUTION INTRAVENOUS at 12:27

## 2018-01-01 RX ADMIN — IPRATROPIUM BROMIDE AND ALBUTEROL SULFATE 3 ML: .5; 3 SOLUTION RESPIRATORY (INHALATION) at 11:07

## 2018-01-01 RX ADMIN — IPRATROPIUM BROMIDE AND ALBUTEROL SULFATE 3 ML: .5; 3 SOLUTION RESPIRATORY (INHALATION) at 22:15

## 2018-01-01 RX ADMIN — METHYLPREDNISOLONE SODIUM SUCCINATE 125 MG: 125 INJECTION, POWDER, FOR SOLUTION INTRAMUSCULAR; INTRAVENOUS at 02:34

## 2018-01-01 RX ADMIN — METHYLPREDNISOLONE SODIUM SUCCINATE 81.25 MG: 125 INJECTION, POWDER, FOR SOLUTION INTRAMUSCULAR; INTRAVENOUS at 07:32

## 2018-01-01 RX ADMIN — CEFTRIAXONE SODIUM 1 G: 1 INJECTION, SOLUTION INTRAVENOUS at 16:33

## 2018-01-01 RX ADMIN — AZITHROMYCIN MONOHYDRATE 500 MG: 500 INJECTION, POWDER, LYOPHILIZED, FOR SOLUTION INTRAVENOUS at 10:36

## 2018-01-01 RX ADMIN — IPRATROPIUM BROMIDE AND ALBUTEROL SULFATE 3 ML: .5; 3 SOLUTION RESPIRATORY (INHALATION) at 19:02

## 2018-01-01 RX ADMIN — DOCUSATE SODIUM 100 MG: 100 CAPSULE, LIQUID FILLED ORAL at 09:14

## 2018-01-01 RX ADMIN — MORPHINE SULFATE 1 MG: 2 INJECTION, SOLUTION INTRAMUSCULAR; INTRAVENOUS at 13:28

## 2018-01-01 RX ADMIN — LORAZEPAM 1 MG: 2 INJECTION, SOLUTION INTRAMUSCULAR; INTRAVENOUS at 17:07

## 2018-01-01 RX ADMIN — FLUTICASONE PROPIONATE 2 PUFF: 110 AEROSOL, METERED RESPIRATORY (INHALATION) at 23:22

## 2018-01-01 RX ADMIN — CEFTRIAXONE SODIUM 2 G: 2 INJECTION, POWDER, FOR SOLUTION INTRAMUSCULAR; INTRAVENOUS at 09:59

## 2018-01-01 RX ADMIN — IPRATROPIUM BROMIDE AND ALBUTEROL SULFATE 3 ML: .5; 3 SOLUTION RESPIRATORY (INHALATION) at 10:16

## 2018-01-01 RX ADMIN — DEXTROSE MONOHYDRATE AND SODIUM CHLORIDE: 5; .45 INJECTION, SOLUTION INTRAVENOUS at 08:59

## 2018-01-01 RX ADMIN — IPRATROPIUM BROMIDE AND ALBUTEROL SULFATE 3 ML: .5; 3 SOLUTION RESPIRATORY (INHALATION) at 15:12

## 2018-01-01 RX ADMIN — IPRATROPIUM BROMIDE AND ALBUTEROL SULFATE 3 ML: .5; 3 SOLUTION RESPIRATORY (INHALATION) at 07:08

## 2018-01-01 RX ADMIN — ENOXAPARIN SODIUM 40 MG: 40 INJECTION SUBCUTANEOUS at 21:15

## 2018-01-01 RX ADMIN — PREDNISONE 60 MG: 20 TABLET ORAL at 10:20

## 2018-01-01 RX ADMIN — POTASSIUM CHLORIDE AND SODIUM CHLORIDE 75 ML/HR: 900; 150 INJECTION, SOLUTION INTRAVENOUS at 04:39

## 2018-01-01 RX ADMIN — POTASSIUM CHLORIDE AND SODIUM CHLORIDE 75 ML/HR: 900; 150 INJECTION, SOLUTION INTRAVENOUS at 09:04

## 2018-01-01 RX ADMIN — IPRATROPIUM BROMIDE AND ALBUTEROL SULFATE 3 ML: .5; 3 SOLUTION RESPIRATORY (INHALATION) at 11:08

## 2018-01-01 RX ADMIN — POTASSIUM CHLORIDE AND SODIUM CHLORIDE 75 ML/HR: 900; 150 INJECTION, SOLUTION INTRAVENOUS at 21:47

## 2018-01-01 RX ADMIN — ALBUTEROL SULFATE 2.5 MG: 2.5 SOLUTION RESPIRATORY (INHALATION) at 21:30

## 2018-01-01 RX ADMIN — ACETAMINOPHEN 975 MG: 325 TABLET, FILM COATED ORAL at 13:38

## 2018-01-01 RX ADMIN — IPRATROPIUM BROMIDE AND ALBUTEROL SULFATE 3 ML: .5; 3 SOLUTION RESPIRATORY (INHALATION) at 03:00

## 2018-01-01 RX ADMIN — ALBUTEROL SULFATE 2.5 MG: 2.5 SOLUTION RESPIRATORY (INHALATION) at 11:50

## 2018-01-01 RX ADMIN — IPRATROPIUM BROMIDE AND ALBUTEROL SULFATE 3 ML: .5; 3 SOLUTION RESPIRATORY (INHALATION) at 06:03

## 2018-01-01 RX ADMIN — ALBUTEROL SULFATE 2.5 MG: 2.5 SOLUTION RESPIRATORY (INHALATION) at 11:55

## 2018-01-01 RX ADMIN — IPRATROPIUM BROMIDE AND ALBUTEROL SULFATE 3 ML: .5; 3 SOLUTION RESPIRATORY (INHALATION) at 07:11

## 2018-01-01 RX ADMIN — AZITHROMYCIN MONOHYDRATE 500 MG: 500 INJECTION, POWDER, LYOPHILIZED, FOR SOLUTION INTRAVENOUS at 17:56

## 2018-01-01 RX ADMIN — METHYLPREDNISOLONE SODIUM SUCCINATE 125 MG: 125 INJECTION, POWDER, FOR SOLUTION INTRAMUSCULAR; INTRAVENOUS at 11:52

## 2018-01-01 RX ADMIN — POTASSIUM CHLORIDE AND SODIUM CHLORIDE: 900; 150 INJECTION, SOLUTION INTRAVENOUS at 17:47

## 2018-01-01 RX ADMIN — LORAZEPAM 0.5 MG: 2 INJECTION, SOLUTION INTRAMUSCULAR; INTRAVENOUS at 17:47

## 2018-01-01 RX ADMIN — IPRATROPIUM BROMIDE AND ALBUTEROL SULFATE 3 ML: .5; 3 SOLUTION RESPIRATORY (INHALATION) at 22:05

## 2018-01-01 RX ADMIN — IPRATROPIUM BROMIDE AND ALBUTEROL SULFATE 3 ML: .5; 3 SOLUTION RESPIRATORY (INHALATION) at 10:08

## 2018-01-01 ASSESSMENT — ACTIVITIES OF DAILY LIVING (ADL)
AMBULATION: 0 - INDEPENDENT
DRESS: 0-->INDEPENDENT
TRANSFERRING: 0 - INDEPENDENT
BATHING: 1-->ASSISTIVE EQUIPMENT
CHANGE_IN_FUNCTIONAL_STATUS_SINCE_ONSET_OF_CURRENT_ILLNESS/INJURY: YES
SWALLOWING: 0 - SWALLOWS FOODS/LIQUIDS WITHOUT DIFFICULTY
DRESS: 0 - INDEPENDENT
FALL_HISTORY_WITHIN_LAST_SIX_MONTHS: NO
RETIRED_EATING: 0-->INDEPENDENT
COGNITION: 0 - NO COGNITION ISSUES REPORTED
COMMUNICATION: 0 - UNDERSTANDS/COMMUNICATES WITHOUT DIFFICULTY
TOILETING: 0-->INDEPENDENT
TOILETING: 0 - INDEPENDENT
RETIRED_COMMUNICATION: 0-->UNDERSTANDS/COMMUNICATES WITHOUT DIFFICULTY
EATING: 0 - INDEPENDENT
TRANSFERRING: 0-->INDEPENDENT
SWALLOWING: 0-->SWALLOWS FOODS/LIQUIDS WITHOUT DIFFICULTY
AMBULATION: 0-->INDEPENDENT
BATHING: 0 - INDEPENDENT

## 2018-01-01 ASSESSMENT — PAIN SCALES - GENERAL: PAINLEVEL: SEVERE PAIN (7)

## 2018-01-01 ASSESSMENT — ENCOUNTER SYMPTOMS
FATIGUE: 1
FEVER: 1
ARTHRALGIAS: 0
COUGH: 1
SHORTNESS OF BREATH: 1
CHILLS: 0
NAUSEA: 0
LIGHT-HEADEDNESS: 0
SHORTNESS OF BREATH: 1
EYES NEGATIVE: 1
VOMITING: 0
AGITATION: 0
COUGH: 1
DYSURIA: 0
WHEEZING: 1
GASTROINTESTINAL NEGATIVE: 1
FEVER: 1

## 2018-01-01 ASSESSMENT — PATIENT HEALTH QUESTIONNAIRE - PHQ9
SUM OF ALL RESPONSES TO PHQ QUESTIONS 1-9: 0

## 2018-01-02 NOTE — TELEPHONE ENCOUNTER
Patient Information     Patient Name MRN Sex Yuval Isaacs [id number] Male 1940      Telephone Encounter by Katlyn Barragan RN at 1/10/2017  8:05 AM     Author:  Katlyn Barragan RN Service:  (none) Author Type:  NURS- Registered Nurse     Filed:  1/10/2017  8:09 AM Encounter Date:  1/10/2017 Status:  Signed     :  Katlyn Barragan RN (NURS- Registered Nurse)            Calcium Channel Blockers    Office visit in the past 12 months or per provider note.    Last visit with [facility] ALVAREZ was on: 2016 in [facility]  Next visit with [facility]OCENCIA ALVAREZ is on: 2017 in [facility]  Next visit with [facility] is on: 2017 in [facility]    Review last provider visit note.  If BP reviewed and plan is noted, can refill.  Max refill for 12 months from last office visit or per provider note.    Nsaids    Office visit in the past 12 months or per provider note.    Last visit with INOCENCIA ALVAREZ was on: 2016 in [facility]  Next visit with [facility]OCENCIA ALVAREZ is on: 2017 in [facility]  Next visit with [facility] is on: 2017 in [facility]    Max refill for 12 months from last office visit or per provider note.    Depression-in adults 18 and over  SSRI    Office visit in the past 12 months or as indicated in chart.  Should have clinic visit 1-2 months after initial prescription.    Last visit with INOCENCIA ALVAREZ was on: 2016 in [facility]  Next visit with INOCENCIA ALVAREZ is on: 2017 in [facility]  Next visit with [facility] is on: 2017 in [facility]    Max refills 12 months from last office visit or per providers notes.    Asthma/COPD    Office visit in the past 12 months.    Last visit with INOCENCIA ALVAREZ was on: 2016 in [facility]  Next visit with INOCENCIA ALVAREZ is on: 2017 in [facility]  Next visit with [facility] is on: 01/11/2017 in [facility]    Max refills 12 months from last office visit.    Prescription refilled per RN Medication Refill Policy.................... DARLEEN WRAY RN ....................  1/10/2017   8:06 AM

## 2018-01-03 NOTE — TELEPHONE ENCOUNTER
Patient Information     Patient Name MRN Yuval Alva [id number] Male 1940      Telephone Encounter by Sierra Pineda at 2017  1:42 PM     Author:  Sierra Pineda Service:  (none) Author Type:  (none)     Filed:  2017  1:53 PM Encounter Date:  2017 Status:  Signed     :  Sierra Pineda            I spoke to Kenna @ [facility] and she has faxed everything over to [facility] as of last Friday.  She is going to call them and try to get Yuval in for a scan tomorrow when they are here @ [facility], rather than waiting 2 weeks when they will be here again.  She will call back and let us know what gets scheduled.    Sierra Pineda ....................  2017   1:52 PM

## 2018-01-03 NOTE — NURSING NOTE
Patient Information     Patient Name MRN Sex Yuval Isaacs [id number] Male 1940      Nursing Note by Jing Tracey at 2017 12:45 PM     Author:  Jing Tracey Service:  (none) Author Type:  (none)     Filed:  2017 12:54 PM Encounter Date:  2017 Status:  Signed     :  Jing Tracey            Patient here for Bowels issues, having soft small amounts 5 to 6 times a day.  Occasional abdominal pain and very gassy. Jing Tracey LPN .......................2017  12:51 PM

## 2018-01-03 NOTE — TELEPHONE ENCOUNTER
Patient Information     Patient Name MRN Sex Yuval Isaacs [id number] Male 1940      Telephone Encounter by Wanda Pierre RN at 2017  2:43 PM     Author:  Wanda Pierre RN Service:  (none) Author Type:  NURS- Registered Nurse     Filed:  2017  2:47 PM Encounter Date:  2017 Status:  Signed     :  Wanda Pierre RN (NURS- Registered Nurse)            Bronchodilator Inhalers     Office visit in the past 12 months.    Last visit with INOCENCIA ALVAREZ was on: 2017 in [facility]  Next visit with INOCENCIA ALVAREZ is on: No future appointment listed with this provider  Next visit with Family Practice is on: No future appointment listed in this department    Max refills 12 months from last office visit.    Prescription refilled per RN Medication Refill Policy.................... Wanda Pierre RN ....................  2017   2:47 PM

## 2018-01-03 NOTE — PROGRESS NOTES
"Patient Information     Patient Name MRN Sex Yuval Isaacs [id number] Male 1940      Progress Notes by Jeffery De Paz MD at 2017 12:45 PM     Author:  Jeffery De Paz MD Service:  (none) Author Type:  Physician     Filed:  2017  9:50 AM Encounter Date:  2017 Status:  Signed     :  Jeffery De Paz MD (Physician)            SUBJECTIVE:    Yuval Law is a 76 y.o. male who presents for frequent bowel movements    HPI Comments: Patient arrives here for frequent bowel movements. States he has soft bowel movements 5-6 times a day. Goes very little. Symptoms were going on for 1 month. No changes in his medication. Colonoscopy approximately 9 years ago.      Allergies      Allergen   Reactions     Morphine  Emotional Disturbance     Paranoid, \"goofy\" per patient report      Naproxen  Headache     Penicillin G Benzathin,Procain  Hives and Rash     Budesonide-Formoterol  Cough     Cialis [Tadalafil]  Other - Describe In Comment Field     Didn't feel well      Flomax [Tamsulosin]  Other - Describe In Comment Field     Weakness, nasal/head/chest congestion       Levofloxacin  Edema and Ruptured Tendon     Ligament problems      Lisinopril  Hives     Sulfa (Sulfonamide Antibiotics)  *Unknown - Pt Doesn't Remember     Sulindac  *Unknown - Pt Doesn't Remember     Tramadol  Insomnia     Mometasone-Formoterol  Intolerance-Can't Take     Throat irritation      Sulfamethoxazole  *Unknown     Patient does not remember    ,   Family History       Problem   Relation Age of Onset     Cancer  Mother       of lung cancer at age 84.       Other  Father       in World War II at age 45       Cancer  Brother       of lung cancer       Cancer  Brother       of lung cancer     ,   Past Surgical History       Procedure   Laterality Date     Tonsillectomy        Colonoscopy screening     and07     next due in 2017       Hand surgeyr  Right      after saw accident       Pr laser " surgery of eye        for glaucoma       Skin cancer surgery        on face, 5 yrs ago       Hx vitrectomy with membrane peel  Left 4/10/2014     Dr. Mix       Cataract extraction  Left 8/19/15     Dr Andrade     ,   Social History      Substance Use Topics        Smoking status:  Former Smoker     Packs/day: 2.00     Types: Cigarettes     Quit date: 1/1/1999     Smokeless tobacco:  Current User     Types: Chew     Alcohol use  No    and   Social History      Substance Use Topics        Smoking status:  Former Smoker     Packs/day: 2.00     Types: Cigarettes     Quit date: 1/1/1999     Smokeless tobacco:  Current User     Types: Chew     Alcohol use  No       REVIEW OF SYSTEMS:  ROS    OBJECTIVE:  /82  Pulse 72  Wt 111.6 kg (246 lb)  BMI 33.36 kg/m2    EXAM:   Physical Exam   Constitutional: He is well-developed, well-nourished, and in no distress.   Abdominal: Soft.   Rectal exam showed no stool in the vault. Prostate smooth. No masses palpated.   Abdominal film shows moderate amount of stool present although suboptimal.    ASSESSMENT/PLAN:    ICD-10-CM    1. Frequent bowel movements R19.4 XR ABDOMEN 1 VIEW        Plan:  Trial of magnesium citrate. If no improvement consider fleets enema. Or mineral oil.

## 2018-01-04 NOTE — TELEPHONE ENCOUNTER
Patient Information     Patient Name MRN Sex Yuval Isaacs [id number] Male 1940      Telephone Encounter by Caitlin Gaxiola at 2017  1:23 PM     Author:  Caitlin Gaxiola Service:  (none) Author Type:  (none)     Filed:  2017  1:29 PM Encounter Date:  2017 Status:  Signed     :  Caitlin Gaxiola            Patient received Rx for Albuterol Neb solution from [facility].  One box was delivered which will only last 5 days, patient is doing Nebs every four hours.  Please reorder for 90 day supply.  Rosa Gaxiola LPN ....................  2017   1:28 PM

## 2018-01-04 NOTE — NURSING NOTE
Patient Information     Patient Name MRN Yuval Alva [id number] Male 1940      Nursing Note by Jing Tracey at 2017  9:00 AM     Author:  Jing Tracey Service:  (none) Author Type:  (none)     Filed:  2017  8:48 AM Encounter Date:  2017 Status:  Signed     :  Jing Tracey            Patient here for URI  Breathing issues and fever for the past 4 days. Jing Tracey LPN .......................2017  8:42 AM

## 2018-01-04 NOTE — TELEPHONE ENCOUNTER
Patient Information     Patient Name MRN Sex Yuval Isaacs [id number] Male 1940      Telephone Encounter by Jing Tracey at 4/10/2017 11:21 AM     Author:  Jing Tracey Service:  (none) Author Type:  (none)     Filed:  4/10/2017 11:25 AM Encounter Date:  4/10/2017 Status:  Signed     :  Jing Tracey            Patient did not take a doxycyline last night and felt better today with his breathing. He felt this medication was making his breathing worse. Fever 99.3, wondering what his next step is. Jing Tracey LPN .......................4/10/2017  11:25 AM

## 2018-01-04 NOTE — TELEPHONE ENCOUNTER
Patient Information     Patient Name MRN Sex Yuval Isaacs [id number] Male 1940      Telephone Encounter by Deyanira Avelar at 4/10/2017 12:40 PM     Author:  Deyanira Avelar Service:  (none) Author Type:  NURS- Licensed Practical Nurse     Filed:  4/10/2017 12:40 PM Encounter Date:  4/10/2017 Status:  Signed     :  Deyanira Avelar            Patient notified of information below.  Deyanira Avelar LPN .............4/10/2017  12:40 PM

## 2018-01-25 PROBLEM — E66.9 OBESITY: Status: ACTIVE | Noted: 2018-01-01

## 2018-01-25 PROBLEM — I10 HYPERTENSION: Status: ACTIVE | Noted: 2018-01-01

## 2018-01-25 PROBLEM — C34.91 ADENOCARCINOMA OF RIGHT LUNG (H): Status: ACTIVE | Noted: 2017-01-01

## 2018-01-25 PROBLEM — E78.00 HYPERCHOLESTEROLEMIA: Status: ACTIVE | Noted: 2018-01-01

## 2018-02-12 NOTE — PROGRESS NOTES
"Patient Information     Patient Name MRN Sex Yuval Isaacs [id number] Male 1940      Progress Notes by Jeffery De Paz MD at 2017 12:30 PM     Author:  Jeffery De Paz MD Service:  (none) Author Type:  Physician     Filed:  12/10/2017  4:31 PM Encounter Date:  2017 Status:  Signed     :  Jeffery De Paz MD (Physician)            SUBJECTIVE:    Yuval Law is a 77 y.o. male who presents for follow-up hypertension    HPI Comments: Patient arrives here for follow-up hypertension. Since his last visit he has stopped the hydrochlorothiazide and meloxicam,fluoxetine Cardizem and vitamin D. States he's feeling much better. States he stopped his blood pressure medication because his blood pressure was getting so low systolic  and even down to 72. At times he was feeling a little lightheaded.      Allergies      Allergen   Reactions     Morphine  Emotional Disturbance     Paranoid, \"goofy\" per patient report      Naproxen  Headache     Penicillin G Benzathin,Procain  Hives and Rash     Budesonide-Formoterol  Cough     Cialis [Tadalafil]  Other - Describe In Comment Field     Didn't feel well      Flomax [Tamsulosin]  Other - Describe In Comment Field     Weakness, nasal/head/chest congestion       Levofloxacin  Edema and Ruptured Tendon     Ligament problems      Lisinopril  Hives     Sulfa (Sulfonamide Antibiotics)  *Unknown - Pt Doesn't Remember     Sulindac  *Unknown - Pt Doesn't Remember     Tramadol  Insomnia     Mometasone-Formoterol  Intolerance-Can't Take     Throat irritation      Sulfamethoxazole  *Unknown     Patient does not remember        REVIEW OF SYSTEMS:  ROS    OBJECTIVE:  /90  Pulse (!) 106  Wt 101.3 kg (223 lb 6.4 oz)  SpO2 94%  BMI 30.26 kg/m2    EXAM:   Physical Exam   Constitutional: He is well-developed, well-nourished, and in no distress.   HENT:   Head: Normocephalic.   Cardiovascular: Normal rate, regular rhythm and normal heart sounds.    No " murmur heard.  Pulmonary/Chest: Effort normal.   Neurological: He is alert.       ASSESSMENT/PLAN:    ICD-10-CM    1. HYPERTENSION I10         Plan:  I discussed his blood pressure currently is not satisfactory and he might want to start his medication at the evening. He is adamant that he runs very low blood pressures even with a slight dose of hydrochlorothiazide and declining since restarting his medication. Continue with blood pressure checks.

## 2018-02-12 NOTE — NURSING NOTE
Patient Information     Patient Name MRN Yuval Alva [id number] Male 1940      Nursing Note by Mallory Pate at 2017  2:15 PM     Author:  Mallory Pate Service:  (none) Author Type:  (none)     Filed:  2017  1:59 PM Encounter Date:  2017 Status:  Signed     :  Mallory Pate            Patient presents to the clinic with a cough.  It's been going on for at least a week.  He has stopped taking all of his prescription medications.  He uses oxygen at 2 liters.  Mallory Pate LPN....................2017 1:48 PM

## 2018-02-12 NOTE — PROGRESS NOTES
Patient Information     Patient Name MRN Sex     Yuval Law [id number] Male 1940      Progress Notes by Eddie Torres MD at 2017  2:15 PM     Author:  Eddie Torres MD Service:  (none) Author Type:  Physician     Filed:  2017  2:48 PM Encounter Date:  2017 Status:  Signed     :  Eddie Torres MD (Physician)            Nursing Notes:   Mallory Pate  2017  1:59 PM  Signed  Patient presents to the clinic with a cough.  It's been going on for at least a week.  He has stopped taking all of his prescription medications.  He uses oxygen at 2 liters.  Mallory Pate LPN....................2017 1:48 PM    Yuval Law is a 77 y.o. male who presents for   Chief Complaint     Patient presents with       Cough      x 1 week     HPI: Mr. Law comes in with DYSPNEA ON EXERTION and cough symptoms; he has been ill for several days without fever; he has COPD and has been treated with Cyberknife for lung cancer. He is getting very tired with activity. His OXYGEN saturation were poor with activity coming in today. OXYGEN is set at 2 liters. Ankles are not swelling and no CP.   Past Medical History:     Diagnosis  Date     Alcohol use 2015    no withdrawal history      Benign non-nodular prostatic hyperplasia with lower urinary tract symptoms 3/11/2016     Calculus of gallbladder 11/15/2016    S/p CT chest 11/15/2016: Numerous radiodense gallstones.      CAP (community acquired pneumonia) 2016    hosp'd again 2016      CAP (community acquired pneumonia) 2016     COPD (chronic obstructive pulmonary disease) (HC)      Diverticulosis of colon (without mention of hemorrhage)      Glaucoma stage, unspecified(365.70)      Hx of flexible sigmoidoscopy 02    polyp      Hyperplastic colon polyp     descending colon at 50 cm. and at 25 cm.      Injury of fifth finger of right hand     Table saw injury, repaired by Dr. Stringer      Knee injury      Injured left knee,in  service      Major depressive disorder, recurrent episode, severe, without mention of psychotic behavior      MI, old     15 years ago, slight, per pt      Obesity, unspecified      Pelvic fracture (HC)     3 years ago, slipped on ice      Pulmonary nodule, right 11/15/2016    S/p CT chest 11/15/2016: 2. 2.5 cm pleural-based nodule in the right costophrenic angle sulcus is strongly concerning for neoplasm. PET/CT evaluation may be of benefit in assessing for hypermetabolic activity. The nodule also appears to be approachable for CT-guided biopsy.      Pure hypercholesterolemia      Sebaceous cyst      small umbilical hernia 3/13/2013     Tobacco abuse 1957    smoked 1-2 ppd; quit at age 58      Unspecified essential hypertension      Past Surgical History:      Procedure  Laterality Date     CATARACT EXTRACTION Left 8/19/15    Dr Andrade       COLONOSCOPY SCREENING    and07    next due in 2017       hand surgeyr Right     after saw accident       HX VITRECTOMY with Membrane Peel Left 4/10/2014    Dr. Mix       VT LASER SURGERY OF EYE      for glaucoma       skin cancer surgery      on face, 5 yrs ago       TONSILLECTOMY       Family History       Problem   Relation Age of Onset     Cancer  Mother       of lung cancer at age 84.       Other  Father       in World War II at age 45       Cancer  Brother       of lung cancer       Cancer  Brother       of lung cancer       Current Outpatient Prescriptions       Medication  Sig Dispense Refill     albuterol (PROVENTIL) 0.083 % neb solution Inhale 3 mL via a nebulizer every 4 hours if needed. 1650 mL 3     albuterol HFA (PROAIR HFA) 90 mcg/actuation inhaler Inhale 1-2 Puffs by mouth every 4 hours while awake. 3 Inhaler 3     albuterol HFA 90 mcg/actuation inhaler Inhale 1-2 Puffs by mouth every 4 hours if needed (Shortness of Breath or Wheezing).       cholecalciferol (VITAMIN D) 1,000 unit tablet Take 1,000 Units  "by mouth at bedtime.       diltiazem (CARDIZEM) 120 mg tablet Take 1 tablet by mouth 2 times daily. 180 tablet 3     FLUoxetine (SARAFEM) 20 mg tablet Take 1 tablet by mouth every morning. 90 tablet 0     fluticasone (FLOVENT HFA) 220 mcg/Actuation inhaler Inhale 1 Puff by mouth 2 times daily. 3 Inhaler 4     Gluco Sulfate NaCl-Chond Hamlin Na (GLUCOSAMINE-CHONDROITIN 3X) 750-600 mg tab Take 1 Tab by mouth 2 times daily.       guaiFENesin 400 mg tablet Take 400 mg by mouth 2 times daily.       hydroCHLOROthiazide (HCTZ) 25 mg tablet Take 1 tablet by mouth once daily. 90 tablet 3     meloxicam 15 mg tablet Take 1 tablet by mouth once daily. 90 tablet 3     Nebulizer Accessories SideStream mouthpiece 1 Kit 0     Nebulizer Nebulizer, PORTABLE, neb kit, neb cup and mask.  Medication: albuterol  For home use. Length of need  for Medicare patients: 99 1 Device 0     Oxygen-Air Delivery Systems (HOME OXYGEN) Oxygen for home use. Liters per minute: 2 per nasal cannula. Frequency of use: Continuous with portability.;. Length of need: lifetime 1 Device 0     tiotropium (SPIRIVA) 18 mcg inhalation capsule Inhale 18 mcg by mouth once daily. 90 capsule 3     No current facility-administered medications for this visit.      Medications have been reviewed by me and are current to the best of my knowledge and ability.    Allergies      Allergen   Reactions     Morphine  Emotional Disturbance     Paranoid, \"goofy\" per patient report      Naproxen  Headache     Penicillin G Benzathin,Procain  Hives and Rash     Budesonide-Formoterol  Cough     Cialis [Tadalafil]  Other - Describe In Comment Field     Didn't feel well      Flomax [Tamsulosin]  Other - Describe In Comment Field     Weakness, nasal/head/chest congestion       Levofloxacin  Edema and Ruptured Tendon     Ligament problems      Lisinopril  Hives     Sulfa (Sulfonamide Antibiotics)  *Unknown - Pt Doesn't Remember     Sulindac  *Unknown - Pt Doesn't Remember     Tramadol  " "Insomnia     Mometasone-Formoterol  Intolerance-Can't Take     Throat irritation      Sulfamethoxazole  *Unknown     Patient does not remember         EXAM:   Vitals:      12/04/17 1354 12/04/17 1359   BP: 160/84 160/86   Pulse: (!) 119    Temp: 98.2  F (36.8  C)    TempSrc: Temporal    SpO2: (!) 86%    Weight: 99.3 kg (219 lb)    Height: 1.83 m (6' 0.05\")      General Appearance: Pleasant, alert, appropriate appearance for age. No acute distress  Chest/Respiratory Exam: distant rhonchi and rales  Cardiovascular Exam: distant sounds NSR  ASSESSMENT AND PLAN:  1. Mucopurulent chronic bronchitis (HC)  Prednisone and z apk    2. 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

## 2018-02-12 NOTE — NURSING NOTE
Patient Information     Patient Name MRN Yuval Alva [id number] Male 1940      Nursing Note by Jing Tracey at 2017 12:30 PM     Author:  Jing Tracey Service:  (none) Author Type:  (none)     Filed:  2017 12:56 PM Encounter Date:  2017 Status:  Signed     :  Jing Tracey            Patient here because he stopped taking his prescribed medications except breathing medications.  with a half of hydrochlorothiazide, his stomach started giving him trouble and he quit meds. Jing Tracey LPN .......................2017  12:52 PM

## 2018-02-12 NOTE — TELEPHONE ENCOUNTER
Patient Information     Patient Name MRN Sex Yuval Isaacs [id number] Male 1940      Telephone Encounter by Gabriella Drummond RN at 2017  9:00 AM     Author:  Gabriella Drummond RN Service:  (none) Author Type:  NURS- Registered Nurse     Filed:  2017  9:04 AM Encounter Date:  12/3/2017 Status:  Signed     :  Gabriella Drummond RN (NURS- Registered Nurse)            [facility] and pt request a refill Rx for fluoxetine (Safafem).    Depression-in adults 18 and over  SSRI    Office visit in the past 12 months or as indicated in chart.  Should have clinic visit 1-2 months after initial prescription.    Last visit with NIK GONZALES was on: 10/10/2017 in [facility]  Next visit with NIK GONZALES is on: No future appointment listed with this provider  Next visit with Family Practice is on: 2017 in [facility]    Max refills 12 months from last office visit or per providers notes.    Prescription refilled per RN Medication Refill Policy.................... Gabriella Drummond RN ....................  2017   9:03 AM

## 2018-02-13 NOTE — PROGRESS NOTES
"Patient Information     Patient Name MRN Sex Yuval Isaacs [id number] Male 1940      Progress Notes by Jeffery De Paz MD at 2018  3:00 PM     Author:  Jeffery De Paz MD Service:  (none) Author Type:  Physician     Filed:  2018  3:10 PM Encounter Date:  2018 Status:  Signed     :  Jeffery De Paz MD (Physician)            SUBJECTIVE:    Yuval Law is a 77 y.o. male who presents for abdominal pain    HPI Comments: Patient arrives here for abdominal pain. Associated with nausea. Been going on for 2-3 weeks. Feels like he wants to burp and has difficulty. This started to get better over the last 24 hours. No changes in bowel or bladder. Patient does have a history of adenocarcinoma of the right lung non-small cell. Patient underwent CyberKnife. No chemotherapy. Patient does have a history of COPD. He was advised they \"think they got it all\".      Allergies      Allergen   Reactions     Morphine  Emotional Disturbance     Paranoid, \"goofy\" per patient report      Naproxen  Headache     Penicillin G Benzathin,Procain  Hives and Rash     Budesonide-Formoterol  Cough     Cialis [Tadalafil]  Other - Describe In Comment Field     Didn't feel well      Flomax [Tamsulosin]  Other - Describe In Comment Field     Weakness, nasal/head/chest congestion       Levofloxacin  Edema and Ruptured Tendon     Ligament problems      Lisinopril  Hives     Sulfa (Sulfonamide Antibiotics)  *Unknown - Pt Doesn't Remember     Sulindac  *Unknown - Pt Doesn't Remember     Tramadol  Insomnia     Mometasone-Formoterol  Intolerance-Can't Take     Throat irritation      Sulfamethoxazole  *Unknown     Patient does not remember     and   Family History       Problem   Relation Age of Onset     Cancer  Mother       of lung cancer at age 84.       Other  Father       in World War II at age 45       Cancer  Brother       of lung cancer       Cancer  Brother       of lung cancer         REVIEW " OF SYSTEMS:  ROS    OBJECTIVE:  /92 (Cuff Site: Right Arm, Position: Sitting)  Pulse 60  Temp 97.1  F (36.2  C) (Temporal)  Wt 95.7 kg (211 lb)  BMI 28.58 kg/m2    EXAM:   Physical Exam   Constitutional:   Older than stated age   Pulmonary/Chest: Effort normal.   Abdominal: Soft. He exhibits no mass. There is tenderness (n the right upper quadrant). There is no rebound and no guarding.   Musculoskeletal: Normal range of motion.   Neurological: He is alert.   Psychiatric: Affect normal.     Results for orders placed or performed in visit on 01/22/18       COMP METABOLIC PANEL       Result  Value Ref Range Status    SODIUM 135 133 - 143 mmol/L Final    POTASSIUM 3.0 (L) 3.5 - 5.1 mmol/L Final    CHLORIDE 96 (L) 98 - 107 mmol/L Final    CO2,TOTAL 30 21 - 31 mmol/L Final    ANION GAP 9 5 - 18                 Final    GLUCOSE 112 (H) 70 - 105 mg/dL Final    CALCIUM 9.1 8.6 - 10.3 mg/dL Final    BUN 12 7 - 25 mg/dL Final    CREATININE 0.83 0.70 - 1.30 mg/dL Final    BUN/CREAT RATIO           14                 Final    GFR if African American >60 >60 ml/min/1.73m2 Final    GFR if not African American >60 >60 ml/min/1.73m2 Final    ALBUMIN 4.3 3.5 - 5.7 g/dL Final    PROTEIN,TOTAL 6.8 6.4 - 8.9 g/dL Final    GLOBULIN                  2.5 2.0 - 3.7 g/dL Final    A/G RATIO 1.7 1.0 - 2.0                 Final    BILIRUBIN,TOTAL 0.7 0.3 - 1.0 mg/dL Final    ALK PHOSPHATASE 81 34 - 104 IU/L Final    ALT (SGPT) 19 7 - 52 IU/L Final    AST (SGOT) 19 13 - 39 IU/L Final   CBC WITH AUTO DIFFERENTIAL       Result  Value Ref Range Status    WHITE BLOOD COUNT         7.5 4.5 - 11.0 thou/cu mm Final    RED BLOOD COUNT           5.20 4.30 - 5.90 mil/cu mm Final    HEMOGLOBIN                17.0 13.5 - 17.5 g/dL Final    HEMATOCRIT                49.5 37.0 - 53.0 % Final    MCV                       95 80 - 100 fL Final    MCH                       32.7 26.0 - 34.0 pg Final    MCHC                      34.3 32.0 - 36.0 g/dL  Final    RDW                       12.3 11.5 - 15.5 % Final    PLATELET COUNT            242 140 - 440 thou/cu mm Final    MPV                       9.4 6.5 - 11.0 fL Final    NEUTROPHILS               73.7 (H) 42.0 - 72.0 % Final    LYMPHOCYTES               14.1 (L) 20.0 - 44.0 % Final    MONOCYTES                 9.5 <12.0 % Final    EOSINOPHILS               1.5 <8.0 % Final    BASOPHILS                 0.5 <3.0 % Final    IMMATURE GRANULOCYTES(METAS,MYELOS,PROS) 0.7 % Final    ABSOLUTE NEUTROPHILS      5.5 1.7 - 7.0 thou/cu mm Final    ABSOLUTE LYMPHOCYTES      1.1 0.9 - 2.9 thou/cu mm Final    ABSOLUTE MONOCYTES        0.7 <0.9 thou/cu mm Final    ABSOLUTE EOSINOPHILS      0.1 <0.5 thou/cu mm Final    ABSOLUTE BASOPHILS        0.0 <0.3 thou/cu mm Final    ABSOLUTE IMMATURE GRANULOCYTES(METAS,MYELOS,PROS) 0.1 <=0.3 thou/cu mm Final       ASSESSMENT/PLAN:    ICD-10-CM    1. Right upper quadrant abdominal pain R10.11 COMP METABOLIC PANEL      US ABDOMEN COMPLETE      CBC WITH DIFFERENTIAL      COMP METABOLIC PANEL      CBC WITH DIFFERENTIAL      CBC WITH AUTO DIFFERENTIAL   2. 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 Hypokalemia E87.6 potassium chloride (K-DUR) 20 mEq Extended-Release tablet        Plan:  Start with an ultrasound. Because of his history of adenocarcinoma he may need a CT scan to rule out metastatic disease. Patient reports he does have a gallbladder and this needs to be evaluated first. We'll add potassium chloride 20 mEq twice a day.

## 2018-02-13 NOTE — PROGRESS NOTES
"Patient Information     Patient Name MRN Sex Yuval Isaacs [id number] Male 1940      Progress Notes by Jeffery De Paz MD at 2018  2:00 PM     Author:  Jeffery De Paz MD Service:  (none) Author Type:  Physician     Filed:  2018  9:56 AM Encounter Date:  2018 Status:  Signed     :  Jeffery De Paz MD (Physician)            SUBJECTIVE:    Yuval Law is a 77 y.o. male who presents for follow-up ultrasound and possible pleural effusion    HPI Comments: Patient arrives here for follow-up ultrasound and possible pleural effusion. Ultrasound did show cholelithiasis. Without cholecystitis. He continues to have pain in the epigastric area and slightly to the right upper quadrant. Eating seems to take it away and improve the pain. On ultrasound and also showed a possible pleural effusion. Patient does have a history of lung cancer. He is status post CyberKnife.      Allergies      Allergen   Reactions     Morphine  Emotional Disturbance     Paranoid, \"goofy\" per patient report      Naproxen  Headache     Penicillin G Benzathin,Procain  Hives and Rash     Budesonide-Formoterol  Cough     Cialis [Tadalafil]  Other - Describe In Comment Field     Didn't feel well      Flomax [Tamsulosin]  Other - Describe In Comment Field     Weakness, nasal/head/chest congestion       Levofloxacin  Edema and Ruptured Tendon     Ligament problems      Lisinopril  Hives     Sulfa (Sulfonamide Antibiotics)  *Unknown - Pt Doesn't Remember     Sulindac  *Unknown - Pt Doesn't Remember     Tramadol  Insomnia     Mometasone-Formoterol  Intolerance-Can't Take     Throat irritation      Sulfamethoxazole  *Unknown     Patient does not remember    ,   Family History       Problem   Relation Age of Onset     Cancer  Mother       of lung cancer at age 84.       Other  Father       in World War II at age 45       Cancer  Brother       of lung cancer       Cancer  Brother       of lung cancer     , "   Current Outpatient Prescriptions on File Prior to Visit       Medication  Sig Dispense Refill     albuterol (PROVENTIL) 0.083 % neb solution Inhale 3 mL via a nebulizer every 4 hours if needed. 1650 mL 3     albuterol HFA (PROAIR HFA) 90 mcg/actuation inhaler Inhale 1-2 Puffs by mouth every 4 hours while awake. 3 Inhaler 3     cholecalciferol (VITAMIN D) 1,000 unit tablet Take 1,000 Units by mouth at bedtime.       FLUoxetine (SARAFEM) 20 mg tablet TAKE 1 TABLET BY MOUTH  EVERY MORNING 90 tablet 2     fluticasone (FLOVENT HFA) 220 mcg/Actuation inhaler Inhale 1 Puff by mouth 2 times daily. 3 Inhaler 4     Gluco Sulfate NaCl-Chond [place]lin Na (GLUCOSAMINE-CHONDROITIN 3X) 750-600 mg tab Take 1 Tab by mouth 2 times daily.       guaiFENesin 400 mg tablet Take 400 mg by mouth 2 times daily.       hydroCHLOROthiazide (HCTZ) 25 mg tablet Take 1 tablet by mouth once daily. 90 tablet 3     Nebulizer Accessories SideStream mouthpiece 1 Kit 0     Nebulizer Nebulizer, PORTABLE, neb kit, neb cup and mask.  Medication: albuterol  For home use. Length of need  for Medicare patients: 99 1 Device 0     Oxygen-Air Delivery Systems (HOME OXYGEN) Oxygen for home use. Liters per minute: 2 per nasal cannula. Frequency of use: Continuous with portability.;. Length of need: lifetime 1 Device 0     potassium chloride (K-DUR) 20 mEq Extended-Release tablet Take 1 tablet by mouth 2 times daily with meals. 90 tablet 2     tiotropium (SPIRIVA) 18 mcg inhalation capsule Inhale 18 mcg by mouth once daily. 90 capsule 3     No current facility-administered medications on file prior to visit.    ,   Past Surgical History:      Procedure  Laterality Date     CATARACT EXTRACTION Left 8/19/15    Dr Andrade       COLONOSCOPY SCREENING   2002 and04/16/07    next due in 2017       hand surgeyr Right     after saw accident       HX VITRECTOMY with Membrane Peel Left 4/10/2014    Dr. Mix       [place] LASER SURGERY OF EYE      for glaucoma       skin cancer surgery  "     on face, 5 yrs ago       TONSILLECTOMY     ,   Social History        Substance Use Topics          Smoking status:   Former Smoker      Packs/day:  2.00      Types:  Cigarettes      Quit date:  1/1/1999      Smokeless tobacco:   Current User      Types:  Chew      Alcohol use   Yes      Comment: occasional       and   Social History        Substance Use Topics          Smoking status:   Former Smoker      Packs/day:  2.00      Types:  Cigarettes      Quit date:  1/1/1999      Smokeless tobacco:   Current User      Types:  Chew      Alcohol use   Yes      Comment: occasional          REVIEW OF SYSTEMS:  ROS    OBJECTIVE:  /100 (Cuff Site: Right Arm, Position: Sitting, Cuff Size: Adult Large)  Pulse (!) 105  Ht 1.83 m (6' 0.05\")  Wt 96.6 kg (213 lb)  SpO2 97%  BMI 28.85 kg/m2    EXAM:   Physical Exam   Constitutional: He is well-developed, well-nourished, and in no distress.   HENT:   Head: Normocephalic.   Cardiovascular: Normal rate, regular rhythm and normal heart sounds.    Pulmonary/Chest: Effort normal.   Abdominal: Soft. He exhibits no distension and no mass. There is tenderness (in the epigastric). There is no rebound and no guarding.       ASSESSMENT/PLAN:    ICD-10-CM    1. SOB (shortness of breath) R06.02 XR CHEST 2 VIEWS PA AND LATERAL      AMB CONSULT TO GENERAL SURGEON   2. Unilateral emphysema (HC) J43.0    3. Epigastric pain R10.13         Plan:  Ongoing abdominal pain. With a positive cholelithiasis although I'm not sure his abdominal pain is related to his gallbladder. Gen. surgery consult for his cholelithiasis and to see if this patient is a candidate for endoscopy.  The chest x-ray did not reveal any pleural effusion. Some scarring present. Will await radiology report.        "

## 2018-02-13 NOTE — NURSING NOTE
Patient Information     Patient Name MRN Sex Yuval Isaacs [id number] Male 1940      Nursing Note by Jing Tracey at 2018  3:00 PM     Author:  Jing Tracey Service:  (none) Author Type:  (none)     Filed:  2018  3:07 PM Encounter Date:  2018 Status:  Signed     :  Jing Tracey            Patient here for abdominal pain for the past 2-3 weeks causign nausea mainly on the right side with pain. Jing Tracey LPN .......................2018  3:03 PM

## 2018-02-13 NOTE — NURSING NOTE
Patient Information     Patient Name MRN Sex Yuval Isaacs [id number] Male 1940      Nursing Note by Charlene Cox at 2018  2:00 PM     Author:  Charlene Cox Service:  (none) Author Type:  (none)     Filed:  2018  1:49 PM Encounter Date:  2018 Status:  Signed     :  Charlene Cox            Yuval Law is a 77 y.o. Male here to f/u on abdominal pain.  Fernanda Cox LPN ...... 2018 1:42 PM

## 2018-03-01 PROBLEM — R07.89 OTHER CHEST PAIN: Status: ACTIVE | Noted: 2018-01-01

## 2018-03-01 NOTE — MR AVS SNAPSHOT
"              After Visit Summary   3/1/2018    Yuval Law    MRN: [id number]           Patient Information     Date Of Birth          1940        Visit Information        Provider Department      3/1/2018 9:45 AM Jeffery De Paz MD [facility]        Today's Diagnoses     Other chest pain    -  1    Adenocarcinoma of right lung (H)        RUQ abdominal pain           Follow-ups after your visit        Who to contact     If you have questions or need follow up information about today's clinic visit or your schedule please contact [facility] directly at [phone].  Normal or non-critical lab and imaging results will be communicated to you by ClickN KIDShart, letter or phone within 4 business days after the clinic has received the results. If you do not hear from us within 7 days, please contact the clinic through Ilusist or phone. If you have a critical or abnormal lab result, we will notify you by phone as soon as possible.  Submit refill requests through VendorShop or call your pharmacy and they will forward the refill request to us. Please allow 3 business days for your refill to be completed.          Additional Information About Your Visit        MyChart Information     VendorShop lets you send messages to your doctor, view your test results, renew your prescriptions, schedule appointments and more. To sign up, go to [email or web address] . Click on \"Log in\" on the left side of the screen, which will take you to the Welcome page. Then click on \"Sign up Now\" on the right side of the page.     You will be asked to enter the access code listed below, as well as some personal information. Please follow the directions to create your username and password.     Your access code is: [email or web address]  Expires: 2018 12:17 PM     Your access code will  in 90 days. If you need help or a new code, please call your [facility] clinic or [phone].        Care EveryWhere ID  "    This is your Care EveryWhere ID. This could be used by other organizations to access your [facility] medical records  [id number]        Your Vitals Were     Pulse Pulse Oximetry BMI (Body Mass Index)             85 97% 28.63 kg/m2          Blood Pressure from Last 3 Encounters:   03/01/18 170/80   02/01/18 (!) 166/100   01/22/18 (!) 150/92    Weight from Last 3 Encounters:   03/01/18 211 lb 6.4 oz (95.9 kg)   02/01/18 213 lb (96.6 kg)   01/22/18 211 lb (95.7 kg)              We Performed the Following     XR Chest 2 Views          Today's Medication Changes          These changes are accurate as of 3/1/18 12:17 PM.  If you have any questions, ask your nurse or doctor.               Start taking these medicines.        Dose/Directions    HYDROcodone-acetaminophen 5-325 MG per tablet   Commonly known as:  NORCO   Used for:  Other chest pain        Dose:  1-2 tablet   Take 1-2 tablets by mouth every 4 hours as needed for moderate to severe pain maximum 4tablet(s) per day   Quantity:  20 tablet   Refills:  0         These medicines have changed or have updated prescriptions.        Dose/Directions    * albuterol (2.5 MG/3ML) 0.083% neb solution   This may have changed:  Another medication with the same name was removed. Continue taking this medication, and follow the directions you see here.        Dose:  3 mL   Take 3 mLs by nebulization every 4 hours as needed   Refills:  0       * albuterol 108 (90 BASE) MCG/ACT Inhaler   Commonly known as:  PROAIR HFA/PROVENTIL HFA/VENTOLIN HFA   This may have changed:  Another medication with the same name was removed. Continue taking this medication, and follow the directions you see here.        Dose:  1-2 puff   Inhale 1-2 puffs into the lungs every 4 hours (while awake)   Refills:  0       predniSONE 20 MG tablet   Commonly known as:  DELTASONE   This may have changed:  See the new instructions.   Used for:  Other chest pain        Dose:  20 mg   Take 1 tablet (20 mg) by  mouth daily   Quantity:  5 tablet   Refills:  0       * Notice:  This list has 2 medication(s) that are the same as other medications prescribed for you. Read the directions carefully, and ask your doctor or other care provider to review them with you.      Stop taking these medicines if you haven't already. Please contact your care team if you have questions.     diltiazem 120 MG tablet   Commonly known as:  CARDIZEM           FLUoxetine 20 MG tablet           meloxicam 15 MG tablet   Commonly known as:  MOBIC           VITAMIN D-1000 MAX ST 1000 UNITS Tabs   Generic drug:  cholecalciferol                Where to get your medicines      These medications were sent to [facility], - [place], [place] - [address]  [address] , [place] [address]     Phone:  [phone]     predniSONE 20 MG tablet         Some of these will need a paper prescription and others can be bought over the counter.  Ask your nurse if you have questions.     Bring a paper prescription for each of these medications     HYDROcodone-acetaminophen 5-325 MG per tablet                Primary Care Provider Office Phone # Fax #    Jeffery De Paz [phone]       [address] [facility] [address]        Equal Access to Services     [facility] AH: Hadii valeriano santana hadasho Soanandali, waaxda luqadaha, qaybta kaalmada adeegyada, marita ford. So [facility] [phone].    ATENCIÓN: Si habla español, tiene a gusman disposición servicios gratuitos de asistencia lingüística. Ericame al [phone].    We comply with applicable federal civil rights laws and [place] laws. We do not discriminate on the basis of race, color, national origin, age, disability, sex, sexual orientation, or gender identity.            Thank you!     Thank you for choosing [facility] AND [facility]  for your care. Our goal is always to provide you with excellent care. Hearing back from  our patients is one way we can continue to improve our services. Please take a few minutes to complete the written survey that you may receive in the mail after your visit with us. Thank you!             Your Updated Medication List - Protect others around you: Learn how to safely use, store and throw away your medicines at www.disposemymeds.org.          This list is accurate as of 3/1/18 12:17 PM.  Always use your most recent med list.                   Brand Name Dispense Instructions for use Diagnosis    * albuterol (2.5 MG/3ML) 0.083% neb solution      Take 3 mLs by nebulization every 4 hours as needed        * albuterol 108 (90 BASE) MCG/ACT Inhaler    PROAIR HFA/PROVENTIL HFA/VENTOLIN HFA     Inhale 1-2 puffs into the lungs every 4 hours (while awake)        fluticasone 220 MCG/ACT Inhaler    FLOVENT HFA     Inhale 1 puff into the lungs 2 times daily        glucosamine-chondroitinoitin 750-600 MG Tabs           guaiFENesin 400 MG Tabs      Take 400 mg by mouth 2 times daily        hydrochlorothiazide 25 MG tablet    HYDRODIURIL     Take 1 tablet by mouth daily        HYDROcodone-acetaminophen 5-325 MG per tablet    NORCO    20 tablet    Take 1-2 tablets by mouth every 4 hours as needed for moderate to severe pain maximum 4tablet(s) per day    Other chest pain       nebulizer nebulization      Nebulizer, PORTABLE, neb kit, neb cup and mask.  Medication: albuterol  For home use. Length of need  for Medicare patients: 99        Potassium Chloride ER 20 MEQ Tbcr      Take 20 mEq by mouth 2 times daily        predniSONE 20 MG tablet    DELTASONE    5 tablet    Take 1 tablet (20 mg) by mouth daily    Other chest pain       tiotropium 18 MCG capsule    SPIRIVA     Inhale 18 mcg into the lungs daily        * Notice:  This list has 2 medication(s) that are the same as other medications prescribed for you. Read the directions carefully, and ask your doctor or other care provider to review them with you.

## 2018-03-01 NOTE — PROGRESS NOTES
SUBJECTIVE:   Yuval Law is a 77 year old male who presents to clinic today for the following health issues: Left chest pain    HPI Comments: Patient arrives here for left chest pain.  He also brings with him literature about CyberKnife potentially causing a inflammatory pneumonitis.  The literature does recommend prednisone.  He has had chest x-rays done.  History of lung cancer.  And also extends down of the right upper quadrant.  Patient has had ultrasounds done which have been unremarkable.  Comes in wanting pain medication more important prednisone.        Patient Active Problem List    Diagnosis Date Noted     Other chest pain 03/01/2018     Priority: Medium     Hypercholesterolemia 01/25/2018     Priority: Medium     Hypertension 01/25/2018     Priority: Medium     Obesity 01/25/2018     Priority: Medium     Adenocarcinoma of right lung (H) 04/07/2017     Priority: Medium     Calculus of gallbladder 11/15/2016     Priority: Medium     Overview:   S/p CT chest 11/15/2016: Numerous radiodense gallstones.       Benign non-nodular prostatic hyperplasia with lower urinary tract symptoms 03/11/2016     Priority: Medium     Alcohol use 08/22/2015     Priority: Medium     DJD (degenerative joint disease) of knee 07/21/2014     Priority: Medium     Glaucoma 03/13/2014     Priority: Medium     COPD (chronic obstructive pulmonary disease) (H) 11/11/2013     Priority: Medium     Overview:   Diagnosed at age 58       Major depressive disorder, recurrent episode, severe (H) 03/15/2012     Priority: Medium     Tobacco abuse 01/01/1957     Priority: Medium     Overview:   smoked 1-2 ppd; quit at age 58       Past Medical History:   Diagnosis Date     Calculus of gallbladder without cholecystitis without obstruction     11/15/2016,S/p CT chest 11/15/2016: Numerous radiodense gallstones.     Chronic obstructive pulmonary disease (H)     1999     Diverticulosis of large intestine without perforation or abscess without  bleeding     No Comments Provided     Enlarged prostate with lower urinary tract symptoms (LUTS)     3/11/2016     Essential (primary) hypertension     No Comments Provided     Fracture of unspecified parts of lumbosacral spine and pelvis, initial encounter for closed fracture (H)     3 years ago, slipped on ice     Glaucoma     No Comments Provided     Injury of lower leg     1964,Injured left knee,in  service     Major depressive disorder, 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)     No Comments Provided     Obesity     No Comments Provided     Old myocardial infarction     15 years ago, slight, per pt     Other specified health status (CODE)     8/22/2015,no withdrawal history     Other specified postprocedural states     01/02/02,polyp     Pneumonia     01/2016,hosp'd again 11/2016     Pneumonia     11/12/2016     Polyp of colon     descending colon at 50 cm. and at 25 cm.     Pure hypercholesterolemia     No Comments Provided     Sebaceous cyst     No Comments Provided     Solitary pulmonary nodule     11/15/2016,S/p CT chest 11/15/2016: 2. 2.5 cm pleural-based nodule in the right costophrenic angle sulcus is strongly concerning for neoplasm. PET/CT evaluation may be of benefit in assessing for hypermetabolic activity. The nodule also appears to be approachable for CT-guided biopsy.     Tobacco use     1957,smoked 1-2 ppd; quit at age 58     Umbilical hernia without obstruction or gangrene     3/13/2013     Unspecified injury of right wrist, hand and finger(s), initial encounter     05/02,Table saw injury, repaired by Dr. Stringer      Past Surgical History:   Procedure Laterality Date     COLONOSCOPY       2002 and04/16/07,next due in 2017     OTHER SURGICAL HISTORY      717042,OTHER,Right,after saw accident     OTHER SURGICAL HISTORY      36965.0,[place] LASER SURGERY OF EYE,for glaucoma     OTHER SURGICAL HISTORY      623939,OTHER,on face, 5 yrs ago     OTHER SURGICAL HISTORY       "4/10/2014,811455,OTHER,Left,Dr. Mix     OTHER SURGICAL HISTORY      8/19/15,SUR2,CATARACT EXTRACTION,Left,Dr Andrade     TONSILLECTOMY      No Comments Provided     Current Outpatient Prescriptions   Medication Sig Dispense Refill     Potassium Chloride ER 20 MEQ TBCR Take 20 mEq by mouth 2 times daily       predniSONE (DELTASONE) 20 MG tablet Take 1 tablet (20 mg) by mouth daily 5 tablet 0     HYDROcodone-acetaminophen (NORCO) 5-325 MG per tablet Take 1-2 tablets by mouth every 4 hours as needed for moderate to severe pain maximum 4tablet(s) per day 20 tablet 0     albuterol (2.5 MG/3ML) 0.083% neb solution Take 3 mLs by nebulization every 4 hours as needed       albuterol (PROAIR HFA/PROVENTIL HFA/VENTOLIN HFA) 108 (90 BASE) MCG/ACT Inhaler Inhale 1-2 puffs into the lungs every 4 hours (while awake)       fluticasone (FLOVENT HFA) 220 MCG/ACT Inhaler Inhale 1 puff into the lungs 2 times daily       glucosamine-chondroitinoitin 750-600 MG TABS        guaiFENesin 400 MG TABS Take 400 mg by mouth 2 times daily       hydrochlorothiazide (HYDRODIURIL) 25 MG tablet Take 1 tablet by mouth daily       nebulizer nebulization Nebulizer, PORTABLE, neb kit, neb cup and mask.  Medication: albuterol  For home use. Length of need  for Medicare patients: 99       tiotropium (SPIRIVA) 18 MCG capsule Inhale 18 mcg into the lungs daily       [DISCONTINUED] albuterol (PROAIR HFA/PROVENTIL HFA/VENTOLIN HFA) 108 (90 BASE) MCG/ACT Inhaler Inhale 1-2 puffs into the lungs every 4 hours as needed for wheezing or shortness of breath / dyspnea       Allergies   Allergen Reactions     Bicillin C-R, Hives and Rash     Morphine      Other reaction(s): Emotional Disturbance  Paranoid, \"goofy\" per patient report     Naproxen      Other reaction(s): Headache     Budesonide-Formoterol Fumarate  [Symbicort] Cough     Levofloxacin      Other reaction(s): Edema, Ruptured Tendon  Ligament problems     Lisinopril Hives     Sulfa Drugs      Other " reaction(s): *Unknown - Pt Doesn't Remember     Sulfamethoxazole Unknown     Patient does not remember     Sulindac      Other reaction(s): *Unknown - Pt Doesn't Remember     Tadalafil Other (See Comments)     Didn't feel well     Tamsulosin Other (See Comments)     Weakness, nasal/head/chest congestion      Tramadol      Other reaction(s): Insomnia     Dulera      Other reaction(s): Intolerance-Can't Take  Throat irritation       Review of Systems     OBJECTIVE:     /80 (BP Location: Right arm, Patient Position: Sitting)  Pulse 85  Wt 211 lb 6.4 oz (95.9 kg)  SpO2 97%  BMI 28.63 kg/m2  Body mass index is 28.63 kg/(m^2).  Physical Exam   Constitutional: He appears well-developed.   Eyes: Pupils are equal, round, and reactive to light.   Cardiovascular: Normal rate and regular rhythm.    Pulmonary/Chest: Breath sounds normal.   Breath sounds are decreased   Abdominal: Soft. He exhibits no distension and no mass. There is tenderness. There is no rebound and no guarding.   Right upper quadrant mild       Chest x-ray unchanged    ASSESSMENT/PLAN:         1. Adenocarcinoma of right lung (H)  Possibly a pneumonitis  - XR Chest 2 Views  We will try prednisone.  Patient was also given a few tablets of hydrocodone to use as needed.  2. Other chest pain    - XR Chest 2 Views  - predniSONE (DELTASONE) 20 MG tablet; Take 1 tablet (20 mg) by mouth daily  Dispense: 5 tablet; Refill: 0  - HYDROcodone-acetaminophen (NORCO) 5-325 MG per tablet; Take 1-2 tablets by mouth every 4 hours as needed for moderate to severe pain maximum 4tablet(s) per day  Dispense: 20 tablet; Refill: 0    3. RUQ abdominal pain          Jeffery De Paz MD  [facility]

## 2018-03-01 NOTE — NURSING NOTE
Patient here for breathing difficulty, dull ache with sitting and up and about he has pain feels like a broken rib. Jing Tracey LPN .......................3/1/2018  9:48 AM

## 2018-03-06 NOTE — ED NOTES
Pt presents via private car with daughter from home.  Pt out of car with assistance to wheelchair.  Pt complains of fever and increase of shortness of breath.  Pt does have a hx of COPD.

## 2018-03-06 NOTE — ED AVS SNAPSHOT
[facility]    [address]    [place] [address]    Phone:  [phone]    Fax:  [phone]                                       Yuval Law   MRN: [id number]    Department:  [facility]   Date of Visit:  3/6/2018           After Visit Summary Signature Page     I have received my discharge instructions, and my questions have been answered. I have discussed any challenges I see with this plan with the nurse or doctor.    ..........................................................................................................................................  Patient/Patient Representative Signature      ..........................................................................................................................................  Patient Representative Print Name and Relationship to Patient    ..................................................               ................................................  Date                                            Time    ..........................................................................................................................................  Reviewed by Signature/Title    ...................................................              ..............................................  Date                                                            Time

## 2018-03-06 NOTE — DISCHARGE INSTRUCTIONS
Mr. Law,  It was nice to meet you.  I'm sorry you've come down with influenza A.  As we talked, I don't think this has been causing your symptoms for the past two months - this is new today with your fever and hopefully starting Tamiflu today will shorten your course of illness a bit.      You are also having some wheezing from your COPD and I think increasing your Flovent to 2 puffs twice a day for the next week may help.  Return to one puff twice a day after that.      Continue using your albuterol nebs every 4-6 hours as needed for wheezing or shortness of breath.    If you have more difficulty breathing you may need follow up in the clinic or ED again.    Drink plenty of water.    I hope you are feeling better soon,  Sincerely,  Dr. Jan Smith        The Flu (Influenza)     The virus that causes the flu spreads through the air in droplets when someone who has the flu coughs, sneezes, laughs, or talks.   The flu (influenza) is an infection that affects your respiratory tract. This tract is made up of your mouth, nose, and lungs, and the passages between them. Unlike a cold, the flu can make you very ill. And it can lead to pneumonia, a serious lung infection. The flu can have serious complications and even cause death.  Who is at risk for the flu?  Anyone can get the flu. But you are more likely to become infected if you:    Have a weakened immune system    Work in a healthcare setting where you may be exposed to flu germs    Live or work with someone who has the flu    Haven t had an annual flu shot  How does the flu spread?  The flu is caused by a virus. The virus spreads through the air in droplets when someone who has the flu coughs, sneezes, laughs, or talks. You can become infected when you inhale these viruses directly. You can also become infected when you touch a surface on which the droplets have landed and then transfer the germs to your eyes, nose, or mouth. Touching used tissues, or sharing  utensils, drinking glasses, or a toothbrush from an infected person can expose you to flu viruses, too.  What are the symptoms of the flu?  Flu symptoms tend to come on quickly and may last a few days to a few weeks. They include:    Fever usually higher than 100.4 F  (38 C) and chills    Sore throat and headache    Dry cough    Runny nose    Tiredness and weakness    Muscle aches  Who is at risk for flu complications?  For some people, the flu can be very serious. The risk for complications is greater for:    Children younger than age 5    Adults ages 65 and older    People with a chronic illness such as diabetes or heart, kidney, or lung disease    People who live in a nursing home or long-term care facility   How is the flu treated?  The flu usually gets better after 7 days or so. In some cases, your healthcare provider may prescribe an antiviral medicine. This may help you get well a little sooner. For the medicine to help, you need to take it as soon as possible (ideally within 48 hours) after your symptoms start. If you develop pneumonia or other serious illness, you may need to stay in the hospital.  Easing flu symptoms    Drink lots of fluids such as water, juice, and warm soup. A good rule is to drink enough so that you urinate your normal amount.    Get plenty of rest.    Ask your healthcare provider what to take for fever and pain.    Call your provider if your fever is 100.4 F (38 C) or higher, or you become dizzy, lightheaded, or short of breath.  Taking steps to protect others    Wash your hands often, especially after coughing or sneezing. Or clean your hands with an alcohol-based hand  containing at least 60% alcohol.    Cough or sneeze into a tissue. Then throw the tissue away and wash your hands. If you don t have a tissue, cough and sneeze into your elbow.    Stay home until at least 24 hours after you no longer have a fever or chills. Be sure the fever isn t being hidden by fever-reducing  medicine.    Don t share food, utensils, drinking glasses, or a toothbrush with others.    Ask your healthcare provider if others in your household should get antiviral medicine to help them avoid infection.  How can the flu be prevented?    One of the best ways to avoid the flu is to get a flu vaccine each year. The virus that causes the flu changes from year to year. For that reason, healthcare providers recommend getting the flu vaccine each year, as soon as it's available in your area. The vaccine is given as a shot. Your healthcare provider can tell you which vaccine is right for you. A nasal spray is also available but is not recommended for the 9965-2587 flu season. The CDC says this is because the nasal spray did not seem to protect against the flu over the last several flu seasons. In the past, it was meant for people ages 2 to 49.    Wash your hands often. Frequent handwashing is a proven way to help prevent infection.    Carry an alcohol-based hand gel containing at least 60% alcohol. Use it when you can't use soap and water. Then wash your hands as soon as you can.    Avoid touching your eyes, nose, and mouth.    At home and work, clean phones, computer keyboards, and toys often with disinfectant wipes.    If possible, avoid close contact with others who have the flu or symptoms of the flu.  Handwashing tips  Handwashing is one of the best ways to prevent many common infections. If you are caring for or visiting someone with the flu, wash your hands each time you enter and leave the room. Follow these steps:    Use warm water and plenty of soap. Rub your hands together well.    Clean the whole hand, including under your nails, between your fingers, and up the wrists.    Wash for at least 15 seconds.    Rinse, letting the water run down your fingers, not up your wrists.    Dry your hands well. Use a paper towel to turn off the faucet and open the door.  Using alcohol-based hand   Alcohol-based  hand  are also a good choice. Use them when you can't use soap and water. Follow these steps:    Squeeze about a tablespoon of gel into the palm of one hand.    Rub your hands together briskly, cleaning the backs of your hands, the palms, between your fingers, and up the wrists.    Rub until the gel is gone and your hands are completely dry.  Preventing the flu in healthcare settings  The flu is a special concern for people in hospitals and long-term care facilities. To help prevent the spread of flu, many hospitals and nursing homes take these steps:    Healthcare providers wash their hands or use an alcohol-based hand  before and after treating each patient.    People with the flu have private rooms and bathrooms or share a room with someone with the same infection.    People who are at high risk for the flu but don't have it are encouraged to get the flu and pneumonia vaccines.    All healthcare workers are encouraged or required to get flu shots.   Date Last Reviewed: 12/1/2016 2000-2017 [employer]. [address] [place] [address], [place] [address]. All rights reserved. This information is not intended as a substitute for professional medical care. Always follow your healthcare professional's instructions.

## 2018-03-06 NOTE — ED AVS SNAPSHOT
[address]    [place] [address]    Phone:  [phone]    Fax:  [phone]                                       Yuval aLw   MRN: [id number]    Department:     Date of Visit:  3/6/2018           Patient Information     Date Of Birth          1940        Your diagnoses for this visit were:     Influenza due to influenza virus, type A, human     Panlobular emphysema (H) Oxygen dependent.       You were seen by Kirill Smith MD.      Follow-up Information     Schedule an appointment as soon as possible for a visit with Jeffery De Paz MD.    Specialty:  Family Practice    Why:  As needed, If symptoms worsen    Contact information:    [address]  [place] [address]  [phone]          Discharge Instructions       Mr. Law,  It was nice to meet you.  I'm sorry you've come down with influenza A.  As we talked, I don't think this has been causing your symptoms for the past two months - this is new today with your fever and hopefully starting Tamiflu today will shorten your course of illness a bit.      You are also having some wheezing from your COPD and I think increasing your Flovent to 2 puffs twice a day for the next week may help.  Return to one puff twice a day after that.      Continue using your albuterol nebs every 4-6 hours as needed for wheezing or shortness of breath.    If you have more difficulty breathing you may need follow up in the clinic or ED again.    Drink plenty of water.    I hope you are feeling better soon,  Sincerely,  Dr. Jan Smith        The Flu (Influenza)     The virus that causes the flu spreads through the air in droplets when someone who has the flu coughs, sneezes, laughs, or talks.   The flu (influenza) is an infection that affects your respiratory tract. This tract is made up of your mouth, nose, and lungs, and the passages between them. Unlike a cold, the flu  can make you very ill. And it can lead to pneumonia, a serious lung infection. The flu can have serious complications and even cause death.  Who is at risk for the flu?  Anyone can get the flu. But you are more likely to become infected if you:    Have a weakened immune system    Work in a healthcare setting where you may be exposed to flu germs    Live or work with someone who has the flu    Haven t had an annual flu shot  How does the flu spread?  The flu is caused by a virus. The virus spreads through the air in droplets when someone who has the flu coughs, sneezes, laughs, or talks. You can become infected when you inhale these viruses directly. You can also become infected when you touch a surface on which the droplets have landed and then transfer the germs to your eyes, nose, or mouth. Touching used tissues, or sharing utensils, drinking glasses, or a toothbrush from an infected person can expose you to flu viruses, too.  What are the symptoms of the flu?  Flu symptoms tend to come on quickly and may last a few days to a few weeks. They include:    Fever usually higher than 100.4 F  (38 C) and chills    Sore throat and headache    Dry cough    Runny nose    Tiredness and weakness    Muscle aches  Who is at risk for flu complications?  For some people, the flu can be very serious. The risk for complications is greater for:    Children younger than age 5    Adults ages 65 and older    People with a chronic illness such as diabetes or heart, kidney, or lung disease    People who live in a nursing home or long-term care facility   How is the flu treated?  The flu usually gets better after 7 days or so. In some cases, your healthcare provider may prescribe an antiviral medicine. This may help you get well a little sooner. For the medicine to help, you need to take it as soon as possible (ideally within 48 hours) after your symptoms start. If you develop pneumonia or other serious illness, you may need to stay in  the hospital.  Easing flu symptoms    Drink lots of fluids such as water, juice, and warm soup. A good rule is to drink enough so that you urinate your normal amount.    Get plenty of rest.    Ask your healthcare provider what to take for fever and pain.    Call your provider if your fever is 100.4 F (38 C) or higher, or you become dizzy, lightheaded, or short of breath.  Taking steps to protect others    Wash your hands often, especially after coughing or sneezing. Or clean your hands with an alcohol-based hand  containing at least 60% alcohol.    Cough or sneeze into a tissue. Then throw the tissue away and wash your hands. If you don t have a tissue, cough and sneeze into your elbow.    Stay home until at least 24 hours after you no longer have a fever or chills. Be sure the fever isn t being hidden by fever-reducing medicine.    Don t share food, utensils, drinking glasses, or a toothbrush with others.    Ask your healthcare provider if others in your household should get antiviral medicine to help them avoid infection.  How can the flu be prevented?    One of the best ways to avoid the flu is to get a flu vaccine each year. The virus that causes the flu changes from year to year. For that reason, healthcare providers recommend getting the flu vaccine each year, as soon as it's available in your area. The vaccine is given as a shot. Your healthcare provider can tell you which vaccine is right for you. A nasal spray is also available but is not recommended for the 1678-8193 flu season. The CDC says this is because the nasal spray did not seem to protect against the flu over the last several flu seasons. In the past, it was meant for people ages 2 to 49.    Wash your hands often. Frequent handwashing is a proven way to help prevent infection.    Carry an alcohol-based hand gel containing at least 60% alcohol. Use it when you can't use soap and water. Then wash your hands as soon as you can.    Avoid  touching your eyes, nose, and mouth.    At home and work, clean phones, computer keyboards, and toys often with disinfectant wipes.    If possible, avoid close contact with others who have the flu or symptoms of the flu.  Handwashing tips  Handwashing is one of the best ways to prevent many common infections. If you are caring for or visiting someone with the flu, wash your hands each time you enter and leave the room. Follow these steps:    Use warm water and plenty of soap. Rub your hands together well.    Clean the whole hand, including under your nails, between your fingers, and up the wrists.    Wash for at least 15 seconds.    Rinse, letting the water run down your fingers, not up your wrists.    Dry your hands well. Use a paper towel to turn off the faucet and open the door.  Using alcohol-based hand   Alcohol-based hand  are also a good choice. Use them when you can't use soap and water. Follow these steps:    Squeeze about a tablespoon of gel into the palm of one hand.    Rub your hands together briskly, cleaning the backs of your hands, the palms, between your fingers, and up the wrists.    Rub until the gel is gone and your hands are completely dry.  Preventing the flu in healthcare settings  The flu is a special concern for people in hospitals and long-term care facilities. To help prevent the spread of flu, many hospitals and nursing homes take these steps:    Healthcare providers wash their hands or use an alcohol-based hand  before and after treating each patient.    People with the flu have private rooms and bathrooms or share a room with someone with the same infection.    People who are at high risk for the flu but don't have it are encouraged to get the flu and pneumonia vaccines.    All healthcare workers are encouraged or required to get flu shots.   Date Last Reviewed: 12/1/2016 2000-2017 [email or web address]. [address] [place] [address], [place] [address]. All rights  reserved. This information is not intended as a substitute for professional medical care. Always follow your healthcare professional's instructions.          24 Hour Appointment Hotline       To make an appointment at any [facility], call [phone] ([phone]). If you don't have a family doctor or clinic, we will help you find one. [facility] clinics are conveniently located to serve the needs of you and your family.             Review of your medicines      START taking        Dose / Directions Last dose taken    oseltamivir 75 MG capsule   Commonly known as:  TAMIFLU   Dose:  75 mg   Quantity:  10 capsule        Take 1 capsule (75 mg) by mouth 2 times daily for 5 days   Refills:  0          Our records show that you are taking the medicines listed below. If these are incorrect, please call your family doctor or clinic.        Dose / Directions Last dose taken    * albuterol (2.5 MG/3ML) 0.083% neb solution   Dose:  3 mL        Take 3 mLs by nebulization every 4 hours as needed   Refills:  0        * albuterol 108 (90 BASE) MCG/ACT Inhaler   Commonly known as:  PROAIR HFA/PROVENTIL HFA/VENTOLIN HFA   Dose:  1-2 puff        Inhale 1-2 puffs into the lungs every 4 hours (while awake)   Refills:  0        fluticasone 220 MCG/ACT Inhaler   Commonly known as:  FLOVENT HFA   Dose:  1 puff        Inhale 1 puff into the lungs 2 times daily   Refills:  0        glucosamine-chondroitinoitin 750-600 MG Tabs        Refills:  0        guaiFENesin 400 MG Tabs   Dose:  400 mg        Take 400 mg by mouth 2 times daily   Refills:  0        hydrochlorothiazide 25 MG tablet   Commonly known as:  HYDRODIURIL   Dose:  1 tablet        Take 1 tablet by mouth daily   Refills:  0        nebulizer nebulization        Nebulizer, PORTABLE, neb kit, neb cup and mask.  Medication: albuterol  For home use. Length of need  for Medicare patients: 99   Refills:  0        Potassium Chloride ER 20 MEQ Tbcr   Dose:  20 mEq        Take 20  mEq by mouth 2 times daily   Refills:  0        tiotropium 18 MCG capsule   Commonly known as:  SPIRIVA   Dose:  18 mcg        Inhale 18 mcg into the lungs daily   Refills:  0        * Notice:  This list has 2 medication(s) that are the same as other medications prescribed for you. Read the directions carefully, and ask your doctor or other care provider to review them with you.            Prescriptions were sent or printed at these locations (1 Prescription)                   [facility], - [place], [place] - [address]   [address], [place] [address]    Telephone:  [phone]   Fax:  [phone]   Hours:                  E-Prescribed (1 of 1)         oseltamivir (TAMIFLU) 75 MG capsule                Procedures and tests performed during your visit     Procedure/Test Number of Times Performed    Blood culture 2    CBC with platelets differential 1    Comprehensive metabolic panel 1    EKG 12-lead, tracing only 1    INR 1    Influenza A and B and RSV PCR 1    Nt probnp inpatient (BNP) 1    Troponin I 1    XR Chest 2 Views 1      Orders Needing Specimen Collection     None      Pending Results     Date and Time Order Name Status Description    3/6/2018 0842 Blood culture In process     3/6/2018 0842 Blood culture In process             Pending Culture Results     Date and Time Order Name Status Description    3/6/2018 0842 Blood culture In process     3/6/2018 0842 Blood culture In process             Thank you for choosing [facility]       Thank you for choosing [facility] for your care. Our goal is always to provide you with excellent care. Hearing back from our patients is one way we can continue to improve our services. Please take a few minutes to complete the written survey that you may receive in the mail after you visit with us. Thank you!        [employer] Information     [employer] lets you send messages to your doctor, view your test results, renew your prescriptions,  "schedule appointments and more. To sign up, go to [email or web address] . Click on \"Log in\" on the left side of the screen, which will take you to the Welcome page. Then click on \"Sign up Now\" on the right side of the page.     You will be asked to enter the access code listed below, as well as some personal information. Please follow the directions to create your username and password.     Your access code is: [email or web address]  Expires: 2018 12:17 PM     Your access code will  in 90 days. If you need help or a new code, please call your [facility] clinic or [phone].        Care EveryWhere ID     This is your Care EveryWhere ID. This could be used by other organizations to access your [facility] medical records  [id number]        Equal Access to Services     AUDRA RAMAN : Kamron Momin, alberto gao, jorge dow, marita sal . So [facility] [phone].    ATENCIÓN: Si habla español, tiene a gusman disposición servicios gratuitos de asistencia lingüística. Llame al [phone].    We comply with applicable federal civil rights laws and [place] laws. We do not discriminate on the basis of race, color, national origin, age, disability, sex, sexual orientation, or gender identity.            After Visit Summary       This is your record. Keep this with you and show to your community pharmacist(s) and doctor(s) at your next visit.                  "

## 2018-03-15 NOTE — TELEPHONE ENCOUNTER
"Returned call to patient. Patient has been starting to feel better and didn't know, if he needed to com in. I read him his discharge note, and patient stated,\"his symptoms were improving\". I told him to call or come in, if he started to feel worse.    Laisha Mcdaniel LPN on 3/15/2018 at 4:30 PM    "

## 2018-03-17 PROBLEM — J96.90 RESPIRATORY FAILURE (H): Status: ACTIVE | Noted: 2018-01-01

## 2018-03-17 PROBLEM — J18.9 PNEUMONIA OF BOTH LOWER LOBES: Status: ACTIVE | Noted: 2018-01-01

## 2018-03-17 PROBLEM — J18.9 PNEUMONIA OF RIGHT LOWER LOBE DUE TO INFECTIOUS ORGANISM: Status: ACTIVE | Noted: 2018-01-01

## 2018-03-17 NOTE — H&P
Went in and aroused patient, he is now more alert and moves around in bed grabbing at mask.  Still not recovered to the degree he was in ED but does appear to be clinically improving slowly over past hour.  Updated family and discussed our plan of care.

## 2018-03-17 NOTE — H&P
[facility]    History and Physical  Hospitalist       Date of Admission:  3/17/2018    Assessment & Plan   Yuval Law is a 77 year old male who presents with respiratory failure due to bilateral community acquired pneumonia with history of oxygen dependant COPD @ 2 LPM via NC and history of right sided lung cancer s/p cyber knife resection >1yr ago.  He was initially tachypneic and hypoxic in the ED.  Mentation seemed normal.  He was started on BiPap (13/5 65% FiO2) which decreased his respiratory distress and his tachypnea and tachycardia resolved by arrival to the ICU.  His second set of ABG's came back largely unchanged so we increased pressure to 15/7 50% FiO2.  Recheck showed dramatic worsening and patient had air leak and depressed respiratory effort of 6.  No sedating substances noted or given.  BiPap set to 18/7 55% FiO2 with BR of 12.  He was arousable but is now confused and somnolent.  Will obtain ABG's again in one hour.    Again discussed with patients family in detail options.  He has told his children in the past he does not want to be intubated.  Will continue BiPap and close monitoring.  Continue Abx and solumedrol IV as he is unlikely going to be taking anything PO in the near term.    Principal Problem:    Respiratory failure (H)    Assessment: Worsening failure    Plan: BiPAP adjusted, continue to monitor and adjust  Active Problems:    COPD (chronic obstructive pulmonary disease) (H)    Assessment: Patient given solumedrol IV, will continue until able to take PO.  Will also use inhaled medications as able when status allows    Plan: as above    Hypertension    Assessment: stable    Plan: monitor    Pneumonia of both lower lobes    Assessment: Was given first dose of rocephin and azithromycin.    Plan: Continue abx IV    # Pain Assessment:   Current Pain Score 3/17/2018 3/6/2018   Patient currently in pain? denies -   Pain score (0-10) 0 5   Yuval s pain level was  assessed and he currently denies pain.      DVT Prophylaxis: Enoxaparin (Lovenox) SQ  Code Status: DNR/DNI    Gigi Melgoza    Primary Care Physician   Jeffery De Paz    Chief Complaint   Short of breath    History is obtained from the patient and chart review.    History of Present Illness   Yuval Law is a 77 year old male who presents with shortness of breath.  Patient reports over the past couple of months he has had increased shortness of breath but symptoms became significantly worse about 2 weeks ago.  On March 6 developed fever with worsening cough.  Went to the emergency department and was found to have influenza A.  Was started on Tamiflu but patient discontinued after a couple of doses when he read the product insert that he understood to mean that people with underlying lung disease should not take the Tamiflu.  Since then has had worsening symptoms.  He reports currently having harsh cough that is largely nonproductive.  He has had recurrent fever up to 100.2.  He has noticed that at times he feels confused especially at night or when waking up from sleeping.  Daughter lives with him and has noted that he seems to be persistently short of breath but have episodes of confusion.  She noticed that his respiratory rate increased significantly and he was uncomfortable so she brought him to the emergency department.    Patient denies any chest pain.  He has not had any black or bloody stools.  No change in urination.  No abdominal pain.  He has not had any transient or persistent neurologic deficits.    Past Medical History    I have reviewed this patient's medical history and updated it with pertinent information if needed.   Past Medical History:   Diagnosis Date     Calculus of gallbladder without cholecystitis without obstruction     11/15/2016,S/p CT chest 11/15/2016: Numerous radiodense gallstones.     Chronic obstructive pulmonary disease (H)     1999     Diverticulosis of large intestine  without perforation or abscess without bleeding     No Comments Provided     Enlarged prostate with lower urinary tract symptoms (LUTS)     3/11/2016     Essential (primary) hypertension     No Comments Provided     Fracture of unspecified parts of lumbosacral spine and pelvis, initial encounter for closed fracture (H)     3 years ago, slipped on ice     Glaucoma     No Comments Provided     Injury of lower leg     1964,Injured left knee,in  service     Major depressive disorder, 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)     No Comments Provided     Obesity     No Comments Provided     Old myocardial infarction     15 years ago, slight, per pt     Other specified health status (CODE)     8/22/2015,no withdrawal history     Other specified postprocedural states     01/02/02,polyp     Pneumonia     01/2016,hosp'd again 11/2016     Pneumonia     11/12/2016     Polyp of colon     descending colon at 50 cm. and at 25 cm.     Pure hypercholesterolemia     No Comments Provided     Sebaceous cyst     No Comments Provided     Solitary pulmonary nodule     11/15/2016,S/p CT chest 11/15/2016: 2. 2.5 cm pleural-based nodule in the right costophrenic angle sulcus is strongly concerning for neoplasm. PET/CT evaluation may be of benefit in assessing for hypermetabolic activity. The nodule also appears to be approachable for CT-guided biopsy.     Tobacco use     1957,smoked 1-2 ppd; quit at age 58     Umbilical hernia without obstruction or gangrene     3/13/2013     Unspecified injury of right wrist, hand and finger(s), initial encounter     05/02,Table saw injury, repaired by Dr. Stringer       Past Surgical History   I have reviewed this patient's surgical history and updated it with pertinent information if needed.  Past Surgical History:   Procedure Laterality Date     COLONOSCOPY       2002 and04/16/07,next due in 2017     OTHER SURGICAL HISTORY      [id number],OTHER,Right,after saw accident     OTHER SURGICAL HISTORY    "   94372.0,[place] LASER SURGERY OF EYE,for glaucoma     OTHER SURGICAL HISTORY      [id number],OTHER,on face, 5 yrs ago     OTHER SURGICAL HISTORY      4/10/2014,[id number],OTHER,Left,Dr. Mix     OTHER SURGICAL HISTORY      8/19/15,SUR2,CATARACT EXTRACTION,Left,Dr Andrade     TONSILLECTOMY      No Comments Provided       Prior to Admission Medications   Prior to Admission Medications   Prescriptions Last Dose Informant Patient Reported? Taking?   Potassium Chloride ER 20 MEQ TBCR   Yes No   Sig: Take 20 mEq by mouth 2 times daily   albuterol (2.5 MG/3ML) 0.083% neb solution   Yes No   Sig: Take 3 mLs by nebulization every 4 hours as needed   albuterol (PROAIR HFA/PROVENTIL HFA/VENTOLIN HFA) 108 (90 BASE) MCG/ACT Inhaler   Yes No   Sig: Inhale 1-2 puffs into the lungs every 4 hours (while awake)   fluticasone (FLOVENT HFA) 220 MCG/ACT Inhaler   Yes No   Sig: Inhale 1 puff into the lungs 2 times daily   glucosamine-chondroitinoitin 750-600 MG TABS   Yes No   guaiFENesin 400 MG TABS   Yes No   Sig: Take 400 mg by mouth 2 times daily   hydrochlorothiazide (HYDRODIURIL) 25 MG tablet   Yes No   Sig: Take 1 tablet by mouth daily   nebulizer nebulization   Yes No   Sig: Nebulizer, PORTABLE, neb kit, neb cup and mask.  Medication: albuterol  For home use. Length of need  for Medicare patients: 99   tiotropium (SPIRIVA) 18 MCG capsule   Yes No   Sig: Inhale 18 mcg into the lungs daily      Facility-Administered Medications: None     Allergies   Allergies   Allergen Reactions     Bicillin C-R, Hives and Rash     Morphine      Other reaction(s): Emotional Disturbance  Paranoid, \"goofy\" per patient report     Naproxen      Other reaction(s): Headache     Budesonide-Formoterol Fumarate  [Symbicort] Cough     Levofloxacin      Other reaction(s): Edema, Ruptured Tendon  Ligament problems     Lisinopril Hives     Sulfa Drugs      Other reaction(s): *Unknown - Pt Doesn't Remember     Sulfamethoxazole Unknown     Patient does not remember     " Sulindac      Other reaction(s): *Unknown - Pt Doesn't Remember     Tadalafil Other (See Comments)     Didn't feel well     Tamsulosin Other (See Comments)     Weakness, nasal/head/chest congestion      Tramadol      Other reaction(s): Insomnia     Dulera      Other reaction(s): Intolerance-Can't Take  Throat irritation       Social History   I have reviewed this patient's social history and updated it with pertinent information if needed. Yuval Law  reports that he quit smoking about 19 years ago. His smoking use included Cigarettes. He smoked 2.00 packs per day. His smokeless tobacco use includes Chew. He reports that he drinks alcohol. He reports that he does not use illicit drugs.    Family History   I have reviewed this patient's family history and updated it with pertinent information if needed.   Family History   Problem Relation Age of Onset     CANCER Mother      Cancer, of lung cancer at age 84.     Other - See Comments Father       in World War II at age 45     CANCER Brother      Cancer, of lung cancer     CANCER Brother      Cancer, of lung cancer       Review of Systems   The 10 point Review of Systems is negative other than noted in the HPI or here.     Physical Exam   Temp: 98.3  F (36.8  C) Temp src: Tympanic BP: 100/62   Heart Rate: 89 Resp: 30 SpO2: 94 % O2 Device: BiPAP/CPAP    Vital Signs with Ranges  Temp:  [98.3  F (36.8  C)-101.5  F (38.6  C)] 98.3  F (36.8  C)  Heart Rate:  [] 89  Resp:  [10-46] 30  BP: ()/(48-84) 100/62  FiO2 (%):  [55 %] 55 %  SpO2:  [67 %-95 %] 94 %  211 lbs 0 oz      INITIAL EXAM below completed at [id number]  Constitutional: Currently in mild respiratory distress with BiPAP on in ED  Eyes: PERRL, EOMI  HEENT: NCAT, TMs and EAC nl.  Thyroid palpated without abnormality  Respiratory: Bilateral thick rhonchi in lower half of lung fields with decreased aeration.  Intercostal retractions, tachypnea and belly breathing persists but is improved  compared to prior to BiPAP  Cardiovascular: Tachycardic rate but regular rhythm.  No murmurs appreciated.  No JVD.  No LE edema.  GI: abdomen protuberant but nontender.  BS normal.  Lymph/Hematologic: No lymphadenopathy  Skin: No abnormal bruising, he does have a few resolving bruises on and scrapes on arms with no concerning features.  Musculoskeletal: No synovitis.  Muscle strength age and body habitus appropriateas well as equal and even.   Neurologic: No focal deficits.  Reflexes symmetric.  Psychiatric: Alert and Oriented x3.  Patient is an adequate medial decision maker.    Data   Data reviewed today:  I personally reviewed the EKG tracing showing sinus tachycardia and the chest x-ray image(s) showing bilateral lower lobe pneumonia.    Recent Labs  Lab 03/17/18  1100   WBC 16.7*   HGB 14.4   MCV 97         POTASSIUM 3.7   CHLORIDE 91*   CO2 40*   BUN 14   CR 0.74   ANIONGAP 4   FLETCHER 9.1   *   ALBUMIN 3.5   PROTTOTAL 6.8   BILITOTAL 0.4   ALKPHOS 68   ALT 22   AST 19       Recent Results (from the past 24 hour(s))   XR Chest 2 Views    Narrative    PROCEDURE:  XR CHEST 2 VW    HISTORY: sob; , .    COMPARISON:  3/6/2018    FINDINGS:  The cardiomediastinal contours are unchanged.  The trachea is midline.   There is calcific aortic atherosclerosis.  Extensive ill-defined airspace opacity is present throughout the right  lung and increased in the left lower lung. Chronic nodularity at the  right lung base is obscured by these changes. There is a small right  pleural effusion.    No suspicious osseous lesion or subdiaphragmatic free air.      Impression    IMPRESSION:      Multifocal new ill-defined air space opacities suggest acute  pneumonia.      GUERITA WASHINGTON MD

## 2018-03-17 NOTE — PROGRESS NOTES
Lc-RT and Dr. Melgoza seeing pt. Large air leak present, mask and pt position adjusted to minimize this. Pt unresponsive for short time, PEEP increased to 18 with pt now responsive and slightly confused. SPO2 increased to 94% with these adjustments. Plan: redraw ABG's at 1845.

## 2018-03-17 NOTE — ED PROVIDER NOTES
History     Chief Complaint   Patient presents with     Respiratory Distress     Patient is a 77 year old male presenting with shortness of breath. The history is provided by the patient.   Shortness of Breath   Associated symptoms: cough and fever    Associated symptoms: no chest pain, no rash and no vomiting      Yuval Law is a 77 year old male who comes in by ambulance complaining of increasing shortness of breath for the last 2 weeks. He was also seen here in the emergency department 11 days ago and diagnosed with influenza. He finished his course of Tamiflu. He has discontinued to feel worse. He has also developed a fever recently, when EMS arrived he was 100.2 .    Problem List:    Patient Active Problem List    Diagnosis Date Noted     Other chest pain 03/01/2018     Priority: Medium     Hypercholesterolemia 01/25/2018     Priority: Medium     Hypertension 01/25/2018     Priority: Medium     Obesity 01/25/2018     Priority: Medium     Adenocarcinoma of right lung (H) 04/07/2017     Priority: Medium     Calculus of gallbladder 11/15/2016     Priority: Medium     Overview:   S/p CT chest 11/15/2016: Numerous radiodense gallstones.       Benign non-nodular prostatic hyperplasia with lower urinary tract symptoms 03/11/2016     Priority: Medium     Alcohol use 08/22/2015     Priority: Medium     DJD (degenerative joint disease) of knee 07/21/2014     Priority: Medium     Glaucoma 03/13/2014     Priority: Medium     COPD (chronic obstructive pulmonary disease) (H) 11/11/2013     Priority: Medium     Overview:   Diagnosed at age 58       Major depressive disorder, recurrent episode, severe (H) 03/15/2012     Priority: Medium     Tobacco abuse 01/01/1957     Priority: Medium     Overview:   smoked 1-2 ppd; quit at age 58          Past Medical History:    Past Medical History:   Diagnosis Date     Calculus of gallbladder without cholecystitis without obstruction      Chronic obstructive pulmonary disease (H)       Diverticulosis of large intestine without perforation or abscess without bleeding      Enlarged prostate with lower urinary tract symptoms (LUTS)      Essential (primary) hypertension      Fracture of unspecified parts of lumbosacral spine and pelvis, initial encounter for closed fracture (H)      Glaucoma      Injury of lower leg      Major depressive disorder, 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)      Obesity      Old myocardial infarction      Other specified health status (CODE)      Other specified postprocedural states      Pneumonia      Pneumonia      Polyp of colon      Pure hypercholesterolemia      Sebaceous cyst      Solitary pulmonary nodule      Tobacco use      Umbilical hernia without obstruction or gangrene      Unspecified injury of right wrist, hand and finger(s), initial encounter        Past Surgical History:    Past Surgical History:   Procedure Laterality Date     COLONOSCOPY        and07,next due in 2017     OTHER SURGICAL HISTORY      [id number],OTHER,Right,after saw accident     OTHER SURGICAL HISTORY      38677.0,[place] LASER SURGERY OF EYE,for glaucoma     OTHER SURGICAL HISTORY      040271,OTHER,on face, 5 yrs ago     OTHER SURGICAL HISTORY      4/10/2014,[id number],OTHER,Left,Dr. Mix     OTHER SURGICAL HISTORY      8/19/15,SUR2,CATARACT EXTRACTION,Left,Dr Andrade     TONSILLECTOMY      No Comments Provided       Family History:    Family History   Problem Relation Age of Onset     CANCER Mother      Cancer, of lung cancer at age 84.     Other - See Comments Father       in World War II at age 45     CANCER Brother      Cancer, of lung cancer     CANCER Brother      Cancer, of lung cancer       Social History:  Marital Status:   [2]  Social History   Substance Use Topics     Smoking status: Former Smoker     Packs/day: 2.00     Types: Cigarettes     Quit date: 1999     Smokeless tobacco: Current User     Types: Chew     Alcohol use Yes       Comment: Alcoholic Drinks/day: occasional        Medications:      Potassium Chloride ER 20 MEQ TBCR   albuterol (2.5 MG/3ML) 0.083% neb solution   albuterol (PROAIR HFA/PROVENTIL HFA/VENTOLIN HFA) 108 (90 BASE) MCG/ACT Inhaler   fluticasone (FLOVENT HFA) 220 MCG/ACT Inhaler   glucosamine-chondroitinoitin 750-600 MG TABS   guaiFENesin 400 MG TABS   hydrochlorothiazide (HYDRODIURIL) 25 MG tablet   nebulizer nebulization   tiotropium (SPIRIVA) 18 MCG capsule         Review of Systems   Constitutional: Positive for fever. Negative for chills.   HENT: Negative for congestion.    Eyes: Negative for visual disturbance.   Respiratory: Positive for cough and shortness of breath.    Cardiovascular: Negative for chest pain.   Gastrointestinal: Negative for nausea and vomiting.   Genitourinary: Negative for dysuria.   Musculoskeletal: Negative for arthralgias.   Skin: Negative for rash.   Neurological: Negative for light-headedness.   Psychiatric/Behavioral: Negative for agitation.       Physical Exam   BP: 121/53  Heart Rate: 115  Temp: 100.9  F (38.3  C)  Resp: 22  Height: 182.9 cm (6')  Weight: 95.7 kg (211 lb)  SpO2: (!) 85 %      Physical Exam   Constitutional: He is oriented to person, place, and time. He appears well-developed and well-nourished. No distress.   HENT:   Head: Normocephalic and atraumatic.   Eyes: Conjunctivae are normal.   Cardiovascular: Regular rhythm.  Tachycardia present.    Pulmonary/Chest: Accessory muscle usage present. No respiratory distress. He has decreased breath sounds. He has wheezes. He has rhonchi. He has no rales.   Abdominal: Soft. Bowel sounds are normal.   Neurological: He is alert and oriented to person, place, and time.   Skin: Skin is warm and dry. He is not diaphoretic.   Psychiatric: He has a normal mood and affect. His behavior is normal.   Nursing note and vitals reviewed.      ED Course     ED Course     Procedures               EKG Interpretation:      Interpreted by Jose  Dorian  Time reviewed: 1413  Symptoms at time of EKG: sob   Rhythm: normal sinus   Rate: Tachycardia  Axis: Normal  Ectopy: premature ventricular contractions (unifocal)  Conduction: normal  ST Segments/ T Waves: No ST-T wave changes  Q Waves: none                 Results for orders placed or performed during the hospital encounter of 03/17/18 (from the past 24 hour(s))   CBC with platelets differential   Result Value Ref Range    WBC 16.7 (H) 4.0 - 11.0 10e9/L    RBC Count 4.58 4.4 - 5.9 10e12/L    Hemoglobin 14.4 13.3 - 17.7 g/dL    Hematocrit 44.5 40.0 - 53.0 %    MCV 97 78 - 100 fl    MCH 31.4 26.5 - 33.0 pg    MCHC 32.4 31.5 - 36.5 g/dL    RDW 13.2 10.0 - 15.0 %    Platelet Count 370 150 - 450 10e9/L    Diff Method Automated Method     % Neutrophils 87.9 %    % Lymphocytes 4.5 %    % Monocytes 6.8 %    % Eosinophils 0.0 %    % Basophils 0.1 %    % Immature Granulocytes 0.7 %    Absolute Neutrophil 14.7 (H) 1.6 - 8.3 10e9/L    Absolute Lymphocytes 0.8 0.8 - 5.3 10e9/L    Absolute Monocytes 1.1 0.0 - 1.3 10e9/L    Absolute Eosinophils 0.0 0.0 - 0.7 10e9/L    Absolute Basophils 0.0 0.0 - 0.2 10e9/L    Abs Immature Granulocytes 0.1 0 - 0.4 10e9/L   Comprehensive metabolic panel   Result Value Ref Range    Sodium 135 134 - 144 mmol/L    Potassium 3.7 3.5 - 5.1 mmol/L    Chloride 91 (L) 98 - 107 mmol/L    Carbon Dioxide 40 (H) 21 - 31 mmol/L    Anion Gap 4 3 - 14 mmol/L    Glucose 115 (H) 70 - 105 mg/dL    Urea Nitrogen 14 7 - 25 mg/dL    Creatinine 0.74 0.70 - 1.30 mg/dL    GFR Estimate >90 >60 mL/min/1.7m2    GFR Estimate If Black >90 >60 mL/min/1.7m2    Calcium 9.1 8.6 - 10.3 mg/dL    Bilirubin Total 0.4 0.3 - 1.0 mg/dL    Albumin 3.5 3.5 - 5.7 g/dL    Protein Total 6.8 6.4 - 8.9 g/dL    Alkaline Phosphatase 68 34 - 104 U/L    ALT 22 7 - 52 U/L    AST 19 13 - 39 U/L   Lactic acid   Result Value Ref Range    Lactic Acid 0.8 0.5 - 2.2 mmol/L   Blood culture   Result Value Ref Range    Specimen Description Blood      Culture Micro No growth after 2 hours    Blood culture   Result Value Ref Range    Specimen Description Blood     Culture Micro No growth after 2 hours    XR Chest 2 Views    Narrative    PROCEDURE:  XR CHEST 2 VW    HISTORY: sob; , .    COMPARISON:  3/6/2018    FINDINGS:  The cardiomediastinal contours are unchanged.  The trachea is midline.   There is calcific aortic atherosclerosis.  Extensive ill-defined airspace opacity is present throughout the right  lung and increased in the left lower lung. Chronic nodularity at the  right lung base is obscured by these changes. There is a small right  pleural effusion.    No suspicious osseous lesion or subdiaphragmatic free air.      Impression    IMPRESSION:      Multifocal new ill-defined air space opacities suggest acute  pneumonia.      GUERITA WASHINGTON MD   Blood gas arterial and oxyhgb   Result Value Ref Range    pH Arterial 7.30 (L) 7.35 - 7.45 pH    pCO2 Arterial CRITICAL CALLED TO DR HOGAN 13:45 3.17.18 35 - 45 mm Hg    pO2 Arterial 53 (L) 83 - 108 mm Hg    Bicarbonate Arterial 36 (H) 22 - 28 mmol/L    FIO2 36%     Oxyhemoglobin Arterial 84 (L) >95 %       Medications   azithromycin (ZITHROMAX) 500 mg in  mL (500 mg Intravenous Given 3/17/18 1227)   ipratropium - albuterol 0.5 mg/2.5 mg/3 mL (DUONEB) neb solution 3 mL (3 mLs Nebulization Given 3/17/18 1107)   cefTRIAXone in d5w (ROCEPHIN) intermittent infusion 1 g (0 g Intravenous Stopped 3/17/18 1227)   methylPREDNISolone sodium succinate (solu-MEDROL) injection 125 mg (125 mg Intravenous Given 3/17/18 1152)   albuterol neb solution 2.5 mg (2.5 mg Nebulization Given 3/17/18 1155)   albuterol neb solution 2.5 mg (2.5 mg Nebulization Given 3/17/18 1150)   acetaminophen (TYLENOL) tablet 975 mg (975 mg Oral Given 3/17/18 1338)       Assessments & Plan (with Medical Decision Making)     I have reviewed the nursing notes.    I have reviewed the findings, diagnosis, plan and need for follow up with the  patient.    Patient with COPD exacerbation and pneumonia. He has been given Rocephin and Zithromax and Solu-Medrol here. He seemed to be getting more tired out, at that point blood gases were obtained with results as above. He was then started on BiPAP. Dr. Melgoza, hospitalist has arrived and has assumed care of the patient. He will will be admitted to the ICU for further cares.         Current Discharge Medication List          Final diagnoses:   COPD exacerbation (H)   Pneumonia of right lower lobe due to infectious organism (H)       3/17/2018   [facility]     Jose Alarcon MD  03/17/18 [id number]

## 2018-03-17 NOTE — ED NOTES
Pt came in by EMS with difficulty breathing for the past 2 weeks.  Pt has been treated for influenza around that time as well. Pt has hx of COPD and lung cancer.  Pt reports a non-productive cough and was given an albuteral neb treatment and EMS reports his blood pressures going from 149/108 to 93/68 after the treatment.  EMS had pt on 15L NRB with O2 at 96%, HR-116 and temp was 100.2.  Pt states taht he has some chest pain that isn't really pain per-say, just feels tired and wore out.  Pt is alert and sitting up during arrival.

## 2018-03-17 NOTE — PROGRESS NOTES
RCAT completed with pt acuity level of 3. Continue current Bipap therapy. RT will continue to follow and monitor. Samantha Matthews, RT

## 2018-03-18 NOTE — PROGRESS NOTES
MD Melgoza and RT both notified of current ABG results. MD Melgoza verbalized orders to recheck ABG in 2 hours.  Orders repeated back and verified as correct. MD Melgoza at BS at this time discussing POC with Pt's family at this time.  All critical monitoring continues.  Gabriella Sanchez RN on 3/17/2018 at 7:30 PM

## 2018-03-18 NOTE — PROGRESS NOTES
0400 Assessment  Preformed and completed. No change since previous 2330 assessment  except see flow sheet.  Pt remains on BiPAP  and is tolerating well at this time .  Pt denies pain.  All critical monitoring continues. Call light in reach bed in low locked position and bed alarm active.  Gabriella Sanchez RN on 3/18/2018 at 5:11 AM

## 2018-03-18 NOTE — PROGRESS NOTES
I evaluated patient at 830pm, at that time his respirations were 20.  He was comfortable.  He was alert and conversant but admitted he still felt confused but was able to answer questions appropriately and asked questions.   Overall mentation was much better than he had been the past couple hours.  Lung exam had not changed.  Was not belly breathing.  Mask fitting well.    ABG's returned with interval worsening despite his improved clinical condition.  Talked with nursing and RT.  Will increase pressures on BiPAP and recheck ABG in 2hrs.  They reported respiratory rate remained 16-20 and mentation had not worsened since my departure.

## 2018-03-18 NOTE — PROGRESS NOTES
MD Melgoza notified of current ABG and BNP results and no new orders received at this time. Pt is noted to be resting well at this time. RT  also notified and discussed with MD decreasing the Pt's FiO2 to 50 % at this time. RT verbalized setting are now currently @ 20/10 with an FiO2 of 50 % now. All critical monitoring continues.  Gabriella Sanchez RN on 3/18/2018 at 12:21 AM

## 2018-03-18 NOTE — PLAN OF CARE
"Problem: Patient Care Overview  Goal: Plan of Care/Patient Progress Review  Pt's respiratory status has improved, ABG's show improvement, pt \"feels better\", is now alert and oriented, performed self cares. Pt has also been off BiPap for extended (>1 hr) periods of time for meals and visitors. Satting in mid 90's on 5L per NC.      "

## 2018-03-18 NOTE — PROGRESS NOTES
MD Melgoza verbalized orders ok to give a one time dose of IVP 5 mg  Metoprolol  PRN for HR sustaining greater than 130. Orders read back and verified  as correct.  This RN will continue to monitor Pt. All critical monitoring continues. Gabriella Sanchez RN on 3/17/2018 at 8:45 PM

## 2018-03-18 NOTE — PROGRESS NOTES
"[facility]    Hospitalist Progress Note      Assessment & Plan   Yuval Law is a 77 year old male with oxygen dependent (2lNC) COPD and history of lung cancer (s/p CyberKnife resection one year ago) who was admitted on 3/17/2018 for respiratory failure.  Patient had worsening shortness of breath and fever and was diagnosed with influenza A on March 6.  Tamiflu treatment was initiated although patient did not complete course.  He has had intermittent confusion and persistent cough and shortness of breath since then.  He then developed worsening symptoms over the course of 24hrs prior to admission including fever and confusion.  He presented to the emergency department tachypneic and hypoxic and was initiated on BiPAP.  ABGs did show respiratory acidosis with compensatory metabolic alkalosis.  At the time of admission patient was a competent medical decision-maker and voiced his desire to be DNR/DNI.  His daughter was in the room and later communicated with me that he has been steadfast in this decision \"over the past year\".      He was admitted to the ICU on BiPAP.  Patient was treated with IV Solu-Medrol and Rocephin/azithromycin as well as aggressive pulmonary toilet.  His condition worsened both clinically and evidenced by worsening hypercarbia and respiratory acidosis until BiPAP pressures were increased to maximum 20/10 and then his symptoms improved significantly and blood glasses are now improving.  This morning his mental status appears to be back to baseline.  He was allowed to take off BiPAP to eat and maintain his oxygen saturations in the low 90s on 5 L.  Plan is to restart BiPAP couple hours and use intermittently today as tolerated.  Repeat ABGs with any change in mental status or other issues.    Principal Problem:    Respiratory failure (H)    Assessment: Multifactorial with oxygen dependent COPD and bilateral pneumonia.    Plan: Intermittent BiPAP, will start oral " prednisone today as tolerating some periods off BiPAP, continue breathing treatments.  Continue antibiotics  Active Problems:    COPD (chronic obstructive pulmonary disease) (H)    Assessment: Improved overnight.  ABGs starting to improve with current BiPAP settings.    Plan: Patient is adamant that he will not take Advair.  Will restart his inhaled steroid.  Continue oral steroids and muscarinic agonist.      Hypertension    Assessment: stable    Plan: monitor    Pneumonia of both lower lobes    Assessment: WBC improving.    Plan: Day #2 of Rocephin/Azithromycin.    # Pain Assessment:   Current Pain Score 3/18/2018 3/18/2018 3/18/2018   Patient currently in pain? denies sleeping: patient not able to self report sleeping: patient not able to self report   Pain score (0-10) 0 - -   Yuval meeks pain level was assessed and he currently denies pain.      DVT Prophylaxis: Enoxaparin (Lovenox) SQ  Code Status: DNR/DNI    Gigi Melgoza    Interval History   Patient reports that he feels much better this morning.  He no longer feels confused.  He is able to talk in full sentences and seems to have excellent cognition.  He is asking appropriate questions and responding to my questions with more than simple answers.  He reports she still feels short of breath but his breathing feels much less tight.  He has noticed that his coughing has improved significantly compared with a few days ago.  He is hungry.  He would like to eat.  He denies any pain.  No anginal equivalents.    -Data reviewed today: I reviewed all new labs and imaging results over the last 24 hours. I personally reviewed no images or EKG's today.    Physical Exam   Temp: 97.3  F (36.3  C) Temp src: Tympanic BP: 136/74   Heart Rate: 93 Resp: 30 SpO2: 96 % O2 Device: BiPAP/CPAP    Vitals:    03/17/18 1033   Weight: 95.7 kg (211 lb)     Vital Signs with Ranges  Temp:  [96  F (35.6  C)-101.5  F (38.6  C)] 97.3  F (36.3  C)  Heart Rate:  [] 93  Resp:  [10-46]  30  BP: ()/(48-84) 136/74  FiO2 (%):  [50 %-55 %] 50 %  SpO2:  [67 %-98 %] 96 %  I/O last 3 completed shifts:  In: 953 [I.V.:953]  Out: 600 [Urine:600]    GEN: No acute distress  OP/NP: Dry mucous membranes.  RESP:  Bilateral rhonchi and poor air entry in lower lung fields.  Currently no increased work of breathing on BiPAP.  CV: Regular rate and rhythm.  S1 and S2 normal.  No murmurs, rubs or gallops.  No jugular venous distension.  No lower extremity edema. Peripheral pulses 2+ and symmetric.    ABD:  Abdomen is soft and nontender.  No distension, rebound or guarding.  Bowel sounds heard in all quadrants.    MSK: No synovitis.  Muscle strength is age and body habitus appropriate.  PSYCH:  Alert and Oriented x3.  Patient is an adequate medical decision maker.      Medications     0.9% sodium chloride + KCl 20 mEq/L 75 mL/hr at 03/18/18 0500       enoxaparin  40 mg Subcutaneous Q24H     docusate sodium  100 mg Oral BID     ipratropium - albuterol 0.5 mg/2.5 mg/3 mL  3 mL Nebulization Q4H     tiotropium  18 mcg Inhalation Daily     cefTRIAXone  2 g Intravenous Q24H     azithromycin  500 mg Intravenous Q24H     methylPREDNISolone  125 mg Intravenous Q6H       Data     Recent Labs  Lab 03/18/18  0526 03/17/18  1100   WBC 13.0* 16.7*   HGB 13.6 14.4   * 97    370    135   POTASSIUM 4.4 3.7   CHLORIDE 96* 91*   CO2 35* 40*   BUN 26* 14   CR 0.68* 0.74   ANIONGAP 8 4   FLETCHER 8.9 9.1   * 115*   ALBUMIN  --  3.5   PROTTOTAL  --  6.8   BILITOTAL  --  0.4   ALKPHOS  --  68   ALT  --  22   AST  --  19       Recent Results (from the past 24 hour(s))   XR Chest 2 Views    Narrative    PROCEDURE:  XR CHEST 2 VW    HISTORY: sob; , .    COMPARISON:  3/6/2018    FINDINGS:  The cardiomediastinal contours are unchanged.  The trachea is midline.   There is calcific aortic atherosclerosis.  Extensive ill-defined airspace opacity is present throughout the right  lung and increased in the left lower lung.  Chronic nodularity at the  right lung base is obscured by these changes. There is a small right  pleural effusion.    No suspicious osseous lesion or subdiaphragmatic free air.      Impression    IMPRESSION:      Multifocal new ill-defined air space opacities suggest acute  pneumonia.      GUERITA WASHINGTON MD

## 2018-03-18 NOTE — PROGRESS NOTES
Ok with Dr. Melgoza for pt to be off BiPap and have breakfast. Breakfast arrives, pt positioned and tray set up. Pt with SPO2 of 90-92% on 5L per NC while eating.

## 2018-03-18 NOTE — PROGRESS NOTES
RT at BS and 12 EKG preformed and completed. Results are A-Fib  See EKG for more detailed information. RT is taking EKG print out to MD Melgoza for review and this RN will follow up with MD Melgoza for need for any further interventions and or orders. Pt is alert opens eyes and is moving to verbal stimuli at this time. Pt denies chest pain at this time. VSS see VS flow sheet. Gabriella Sanchez RN on 3/17/2018 at 8:36 PM

## 2018-03-18 NOTE — PROGRESS NOTES
MD Melgoza notified that Pt has had a change in cardiac rhythm per monitor. Pt monitor is displaying controlled rate A-Fib  . Pt displays no S& S of complications at this time. Orders obtained to obtain EKG and continue to monitor Pt. Orders read back and verified as correct.  Gabriella Sanchez RN on 3/17/2018 at 8:07 PM

## 2018-03-18 NOTE — PROGRESS NOTES
MD Carlson called and verbalized orders to draw an BNP on the Pt at this time and call him if results are greater than 500. Orders  to recheck ABG at midnight to follow up on  Progress since last ABG .  Orders read back and verified as correct.  Gabriella Sanchez RN on 3/17/2018 at 11:55 PM

## 2018-03-18 NOTE — PROGRESS NOTES
This Rn continues to wait for BNP results at this time. Called lab and Lab verbalized it should be showing results; however, no results noted at this time .  verbalized he will look into it.   is now currently at BS to obtain the midnight ABG as per orders. This RN will continue to monitor for results. Pt is resting quietly at this time. Pt is currently back in a SR with HR 87 and frequent PVC's noted. VSS  See VS flow sheet.   RR 18 Pt remains on BiPAP  And is tolerating well at this time.

## 2018-03-19 NOTE — PROGRESS NOTES
0415 PT assessment preformed and completed. No change from previous assessment except see flow sheet. Pt continues to have frequent times of confusion.  Pt continues to remove his BiPAP and O2 NC frequently. Pt does re orientate  but shortly after forgets the information again.  Gabriella Sanchez RN on 3/19/2018 at 10:38 AM

## 2018-03-19 NOTE — PROGRESS NOTES
0800 Pt assessment preformed and completed. No changes noted since previous  0415 assessment except see Flow sheet.  Pt continues to be confused frequently and refuses to wear the BiPAP mask as recommended . Pt at times continues to remove his O2 NC as well.  Pt requires frequent close monitoring and extra time of staff at BS in order to remain compliant with cares.  All critical monitoring continues. Gabriella Sanchez RN on 3/19/2018 at 10:51 AM

## 2018-03-19 NOTE — PROGRESS NOTES
Pt pulled BiPAP mask off again and refuses to wear it at this time. MD aware . O 2 NC applied @ 5 liters . MD Rossie  Into see PT at this time. Gabriella Sanchez RN on 3/19/2018 at 11:24 AM

## 2018-03-19 NOTE — PROGRESS NOTES
Pt becoming more restless, anxious and agitated. Unable to keep monitoring equipment and BiPap on, RN and family concerned for pt safety. Lorazepam 0.5 mg IV given, pt actually became more agitated. HR now in 130's, 's, SPO2 85%. Dr. Peña called, orders for Haldol 5 mg IV obtained she suggests contacting Dr. Melo if further problems, and to have Dr. Melo assess pt. Haldol given, no apparent effect, pt still agitated, now grabbing at staff members so Dr. Melo called to assess pt, as Dr. Melo arrives, pt lays back for approx 3 min then agitated once again. Now it is 1850, new shift arrives and assist with pt. Morphine 2 mg IV given at this time, approx 15 min later pt is lying on right side and is quiet. BiPap removed during these events as it appears to be further agitating pt. Pt now on 5L per NC.

## 2018-03-19 NOTE — PROGRESS NOTES
Pt is A & O x 4. Pt is however beginning to display more frequent signs of confusion again as night progresses. Pt is fidgiting with his lines and tubes and pressing his call light approximately every 2-5 minutes and then making odd request such as to go to bed when he is currently already in bed. Then he climbs out of bed and forgets he has lines and tubes connected to him. Pt is easily redirected and reoriented , but  Once he is left alone without stimulation for short periods of time 5-15 min. He is attempting to get back up and pull on lines and tubes again. Though Pt does remember to turn call light on when he wants  a drink or to go to the [facility].   Pt's VSS  See VS flow sheet.   Pt verbalized that he knows he is being confused at times but he doesn't understand why . Pt also stated he notice it happens often at home now too.  Pt will have multiple request and and questions for staff each time they enter room.  Pt does not tolerate to be in room alone  Very well. All critical monitoring continues . Gabriella Sanchez RN on 3/19/2018 at 2:39 AM

## 2018-03-19 NOTE — PROGRESS NOTES
"[facility]    Hospitalist Progress Note      Assessment & Plan   Yuval Law is a 77 year old male who was admitted on 3/17/2018 with CAP following influenza A. Has required BiPAP with high settings and has been intermittently confused. Currently mentating well, complains of not tolerating mask, does not want to use today. Says he feels better with NC O2. Understands that he may not recover from this illness and is accepting of that. \"I've decided that I'm not going to do anything I don't want to do.\" Will consider using lorazepam and mask for sleep tonight.    Principal Problem:    Respiratory failure (H)    Assessment: multifactorial, has O2-dependent COPD, Hx lung cancer s/p CyberKnife resection, now with influenza A and secondary pneumonia.     Plan: discussed with patient as above. Will use NC O2, BiPAP as acceptable; trial of low-dose lorazepam if desired to help tolerate mask  Active Problems:    COPD (chronic obstructive pulmonary disease) (H)    Assessment: O2-dependent at baseline. Day #3 steroids, Day #2 Prednisone    Plan: continue Prednisone, Spiriva, inhaled steroid, scheduled nebs    Hypertension    Assessment: on no medications at home. BP generally acceptable    Plan: continue same    Pneumonia of both lower lobes    Assessment: clinically stable to slightly worsened. Day #3 ceftriaxone and azithromycin    Plan: continue same for 5-7 day course    # Pain Assessment:   Current Pain Score 3/19/2018 3/19/2018 3/18/2018   Patient currently in pain? denies denies denies   Pain score (0-10) 0 0 0   Yuval meeks pain level was assessed and he currently denies pain.      DVT Prophylaxis: Enoxaparin (Lovenox) SQ  Code Status: DNR/DNI    Radha Peña    Interval History   Difficult night with confusion, agitation, is refusing to keep BiPap mask on. Aware he's confused at night and has been noticing the same thing at home for a couple of months. Still has some cough, some discomfort with " deep breath L>R. Denies N/V/D.     -Data reviewed today: I reviewed all new labs and imaging results over the last 24 hours. I personally reviewed no images or EKG's today.    Physical Exam   Temp: 97.2  F (36.2  C) Temp src: Tympanic BP: (!) 110/104 Pulse: 104 Heart Rate: 111 Resp: 23 SpO2: (!) 88 % O2 Device: BiPAP/CPAP Oxygen Delivery: 5 LPM  Vitals:    03/17/18 1033   Weight: 95.7 kg (211 lb)     Vital Signs with Ranges  Temp:  [97  F (36.1  C)-97.4  F (36.3  C)] 97.2  F (36.2  C)  Pulse:  [104] 104  Heart Rate:  [] 111  Resp:  [8-36] 23  BP: ()/() 110/104  FiO2 (%):  [45 %-50 %] 45 %  SpO2:  [84 %-99 %] 88 %  I/O last 3 completed shifts:  In: 1190 [P.O.:240; I.V.:950]  Out: 2825 [Urine:2225; Stool:600]    Constitutional: Alert, OX3, sitting up at edge of bed  Respiratory: Mild increased work of breathing with prolonged conversation. Poor air exchange on R, fairly good on L. Basilar crackles on L, exp wheezes B upper lung fields  Cardiovascular: RRR, mildly tachy, no murmur  GI: soft, NT  Skin/Integument: mild erythema between buttocks      Medications     0.9% sodium chloride + KCl 20 mEq/L 75 mL/hr at 03/19/18 0918       fluticasone  2 puff Inhalation BID     predniSONE  60 mg Oral Daily     enoxaparin  40 mg Subcutaneous Q24H     docusate sodium  100 mg Oral BID     ipratropium - albuterol 0.5 mg/2.5 mg/3 mL  3 mL Nebulization Q4H     tiotropium  18 mcg Inhalation Daily     cefTRIAXone  2 g Intravenous Q24H       Data     Recent Labs  Lab 03/19/18  0416 03/18/18  0526 03/17/18  1100   WBC 18.5* 13.0* 16.7*   HGB 12.6* 13.6 14.4   * 101* 97    355 370    139 135   POTASSIUM 4.6 4.4 3.7   CHLORIDE 101 96* 91*   CO2 35* 35* 40*   BUN 34* 26* 14   CR 0.68* 0.68* 0.74   ANIONGAP 3 8 4   FLETCHER 8.6 8.9 9.1   * 156* 115*   ALBUMIN  --   --  3.5   PROTTOTAL  --   --  6.8   BILITOTAL  --   --  0.4   ALKPHOS  --   --  68   ALT  --   --  22   AST  --   --  19

## 2018-03-19 NOTE — PROGRESS NOTES
Talked with MD and pt will be off BIPAP all day and may reconsider wearing only for night time. Katlyn Camacho RT

## 2018-03-19 NOTE — PROGRESS NOTES
Pt daughter Sivan called for an update on Pt. Pt verbalized ok to talk with and give her information about him at this time.  Sivan updated on POC. Sivan verbalized understanding of information given. Sivan denies further questions at this time.Gabriella Sanchez RN on 3/18/2018 at 7:46 PM

## 2018-03-20 NOTE — PLAN OF CARE
Lab called to notify this RN of critical lab pCO2  level @ 79.  Critical not called to MD at this time. Pt lab results is as expected . Pt is current on BiPAP and resting quietly at this time. Will notify MD in the A.M. And or Pass on in report to oncoming RN.  All critical monitoring continues. Gabriella Sanchez RN on 3/20/2018 at 4:36 AM

## 2018-03-20 NOTE — PROGRESS NOTES
[facility]    Hospitalist Progress Note      Assessment & Plan   Yuval Law is a 77 year old male who was admitted on 3/17/2018 with pneumonia following influenza A. Increased agitation and worsening respiratory status after not using BiPap most of day yesterday. Today will increase use of sedation to allow BiPap.   FEN: I/O not accurate. K+ and Na+ up slightly. Convert maintenance fluids to D51/2 NS, no K+.    Principal Problem:    Respiratory failure (H)    Assessment: related to influenza A and secondary pneumonia, with underlying COPD and Hx of lung cancer s/p CyberKnife resection. Increased agitation likely related to CO2 retention.    Plan: increase use of sedation and BiPap today.   Active Problems:    COPD (chronic obstructive pulmonary disease) (H)    Assessment: O2-dependent at baseline. Day #4 steroids, Day #3 prednisone    Plan: continue Prednisone, Spiriva, inhaled steroid, scheduled nebs as tolerated. Convert back to solumedrol if not able to take prednisone    Hypertension    Assessment: on no medications at home. BP acceptable when not agitated    Plan: continue prn metoprolol for ST and elevated BP, if sedation not adequate    Pneumonia of both lower lobes    Assessment: some improvement in exam today. WBC trending down. Day #4 ceftriaxone and azithromycin    Plan: recheck CXR if able to tolerate    # Pain Assessment:   Current Pain Score 3/20/2018 3/19/2018 3/19/2018   Patient currently in pain? sleeping: patient not able to self report other (see comments) -   Pain score (0-10) (No Data) - (No Data)   - Yuval is unable to participate in a collaborative plan for pain management due to sedation.   - continue morphine as needed    DVT Prophylaxis: Enoxaparin (Lovenox) SQ  Code Status: DNR/DNI    Radha Peña    Interval History   Patient became more agitated and restless at end of day yesterday, no response to 0.5 mg lorazepam, no response to Haldol. Better with IV  morphine. Slept some of night with BiPap mask on, but confused and unable to converse this morning.     -Data reviewed today: I reviewed all new labs and imaging results over the last 24 hours. Will repeat CXR today if able    Physical Exam   Temp: 96.9  F (36.1  C) Temp src: Tympanic BP: 103/56 Pulse: 99 Heart Rate: 110 Resp: (!) 38 SpO2: 99 % O2 Device: Nasal cannula Oxygen Delivery: 5 LPM  Vitals:    03/17/18 1033   Weight: 95.7 kg (211 lb)     Vital Signs with Ranges  Temp:  [96.9  F (36.1  C)-98.3  F (36.8  C)] 96.9  F (36.1  C)  Pulse:  [99] 99  Heart Rate:  [] 110  Resp:  [10-38] 38  BP: ()/() 103/56  FiO2 (%):  [40 %-45 %] 40 %  SpO2:  [82 %-100 %] 99 %  I/O last 3 completed shifts:  In: 3712 [P.O.:556; I.V.:3156]  Out: 775 [Urine:775]    Constitutional: Currently sleeping  Respiratory: resp unlabored with BiPap mask. Some improvement in air exchange L upper and mid lung fields, crackles audible. Fair to good air exchange on R. No wheezes  Cardiovascular: RRR, no murmur heard  GI: soft with no palpable mass or apparent tenderness  Skin/Integument: erythema over buttocks      Medications     dextrose 5% and 0.45% NaCl         azithromycin  500 mg Intravenous Daily     fluticasone  2 puff Inhalation BID     predniSONE  60 mg Oral Daily     enoxaparin  40 mg Subcutaneous Q24H     docusate sodium  100 mg Oral BID     ipratropium - albuterol 0.5 mg/2.5 mg/3 mL  3 mL Nebulization Q4H     tiotropium  18 mcg Inhalation Daily     cefTRIAXone  2 g Intravenous Q24H       Data     Recent Labs  Lab 03/20/18  0408 03/19/18  0416 03/18/18  0526 03/17/18  1100   WBC 14.7* 18.5* 13.0* 16.7*   HGB 12.4* 12.6* 13.6 14.4   * 101* 101* 97    383 355 370   * 139 139 135   POTASSIUM 5.3* 4.6 4.4 3.7   CHLORIDE 106 101 96* 91*   CO2 38* 35* 35* 40*   BUN 36* 34* 26* 14   CR 0.68* 0.68* 0.68* 0.74   ANIONGAP 2* 3 8 4   FLETCHER 8.5* 8.6 8.9 9.1   * 209* 156* 115*   ALBUMIN  --   --   --  3.5    PROTTOTAL  --   --   --  6.8   BILITOTAL  --   --   --  0.4   ALKPHOS  --   --   --  68   ALT  --   --   --  22   AST  --   --   --  19

## 2018-03-20 NOTE — PROGRESS NOTES
Pt is resting quietly in bed at this time with eyes closed but arouses easily. Pt  Remains on 5 liters O 2 NC.  VSS are starting to stabilize and Pt remains A-Febrile Temp 97.7 .  All critical monitoring continues.  Gabriella Sanchez RN on 3/19/2018 at 8:22 PM

## 2018-03-20 NOTE — PROGRESS NOTES
Pt's son Manny arrived to visit with Pt and this RN updated him and the daughter Sivan on the need for the Pt to rest at this time. RN requested that pt have only minimal stimulation at this time to allow him to continue to rest.  Family verbalized understanding of information given and denies need for further information at this time.  Gabriella Sanchez RN on 3/19/2018 at 8:46 PM

## 2018-03-20 NOTE — PROGRESS NOTES
Pt Hr elevated to 140-160's and sustaining even with only mild agitation. Pt also noted to be using accessory muscles slightly more with breaths , and his resp increased to 20's-38  Pt is noted to be diminished and tight sounding with expository wheezes noted throughout. Pt is also having congested nonproductive weak cough noted.  Pt was given 5 mg IVP Metoprolol as per orders see EMAR and HR decreased to 110- 130's  RR 34 SPO2 85 % on NC 5 liters  So with HR being decreased at this time Neb treatment administered as per orders see EMAR.  Pt is beginning to relax again slightly at this time with continuous encouragement.  All critical monitoring  continues . Pt is alert but still confused and restless. Gabriella Sanchez RN on 3/19/2018 at 9:43 PM

## 2018-03-20 NOTE — PLAN OF CARE
Problem: Patient Care Overview  Goal: Plan of Care/Patient Progress Review  Outcome: Declining  Pt showed increased agitation for night shift RN and increased confusion this morning. Pt was not tolerating bipap mask at intervals throughout the night, per RN; due to increased agitation and restlessness, discussed with  this morning that the pt needs rest and bipap today, and monitoring of mental status and respiratory/cardiac status frequently. IV ativan and morphine are ordered to assist with rest.

## 2018-03-20 NOTE — PROGRESS NOTES
Pt was increasingly more restless after being placed on a nasal cannula this morning. Placed on bipap, resting on right side. Pt is less restless at this time, sats 95%, 's. Will monitor.

## 2018-03-20 NOTE — PROGRESS NOTES
Pt very antsy and agitated on BIPAP. Took BIPAP off and placed on 5L NC. Pt was ripping of NC and neb treatment throughout this time. Pt in bed at this time. Katlyn Camacho RT

## 2018-03-20 NOTE — PROGRESS NOTES
Pt is now resting more comfortably after meds. Repositioned; cleaned of urine incontinence. Bi-pap remains on at this time, pt is tolerating mask.   Dr. Peña is talking to pt's daughter.

## 2018-03-20 NOTE — PROGRESS NOTES
Per MD, due to pt being sleepy, ok to wait until he is more awake for the chest xray. Could even change it to portable later, if needed.

## 2018-03-20 NOTE — PROGRESS NOTES
0400 Assessment preformed  And complete . No changes noted from previous 0000 assessment except  See flow sheet.  Pt has remained on BiPAP from 2230 till 0630 this A.M. and tolerated  well with the use of current PRN orders that were administered to help treat his agitation and his Increased Resp Rate and work load that would accompany his uncooperative behavior and his restlessness that is keeping him from sleeping the last few days. Pt has had bare minimal sleep the last few days and he is noted to also be increasing in his confusion more each day.  PRN morphine given and neb treatments given as per orders when Pt wakes and becomes agitated and uncooperative.  Post administration the Pt is noted to be able to relax more and sleep. Last does of morphine given at 0630 . Oncoming RN aware and notified that it allows the Pt to rest for 3-4 hours and he is minimally restless during that time . Pt opens eyes and responds to stimuli but is drowsy.  POC is discussed with oncoming RN to let this dose of morphine run it's coarse than see if the Pt's alertness and agitation is improved with a good nights rest and encouragement to get up to chair , eat breakfast and get bathed . Pt family and Pt both educated on S & S of ICU delirium and sleep depravation and the effects they can have on the mental status of Pt's, and  Our POC to treat such complications. Pt family verbalized understanding of information given and denies further questions at this time.  All critical monitoring continues. Gabriella Sanchez RN on 3/20/2018 at 8:12 AM

## 2018-03-20 NOTE — PROGRESS NOTES
Pt started to get a bit more restless, HR increased to 120's. 1 mg IV morphine given. Will continue to assess.

## 2018-03-21 NOTE — PROGRESS NOTES
Rhythm change on cardiac monitor from Sinus Tach in 140's to Atrial flutter with rate in 40's. Family informed that death may be imminent and if anyone needs to be called that it should be done now. Pt unresponsive at this time. Few agonal breaths noted, then absence of apical pulse, this confirmed by 2nd RN and Dr. Peña updated of events.

## 2018-03-21 NOTE — DISCHARGE SUMMARY
"[facility]    Death Summary  Hospitalist    Date of Admission:  3/17/2018  Date of Death:         3/21/2018  Provider Completing Death Summary: Radha Peña    Discharge Diagnoses   Principal Problem:    Respiratory failure (H)  Active Problems:    COPD (chronic obstructive pulmonary disease) (H)    Hypertension    Pneumonia of both lower lobes      History of Present Illness   Yuval Law is an 77 year old male who presented with shortness of breath. From Dr. Melgoza's H&P: \"Patient reports over the past couple of months he has had increased shortness of breath but symptoms became significantly worse about 2 weeks ago.  On March 6 developed fever with worsening cough.  Went to the emergency department and was found to have influenza A.  Was started on Tamiflu but patient discontinued after a couple of doses when he read the product insert that he understood to mean that people with underlying lung disease should not take the Tamiflu.  Since then has had worsening symptoms.  He reports currently having harsh cough that is largely nonproductive.  He has had recurrent fever up to 100.2.  He has noticed that at times he feels confused especially at night or when waking up from sleeping.  Daughter lives with him and has noted that he seems to be persistently short of breath but have episodes of confusion.  She noticed that his respiratory rate increased significantly and he was uncomfortable so she brought him to the emergency department.\"     Hospital Course   Yuval Law was admitted on 3/17/2018.  The following problems were addressed during his hospitalization:    1.  Respiratory failure: In the emergency department, patient was started on BiPAP and admitted to the ICU.  Follow-up gas showed dramatic worsening.  Patient had depressed respiratory effort.  Adjustment of settings resulted in some improvement and improved mentation.  In the past, patient had clearly stated to his family " that he did not wish intubation.  BiPAP was therefore continued.  He was treated with antibiotics and Solu-Medrol.  With further pressure modifications gases and mental status improved.  3/19/2018 patient was alert and mentally clear.  He noted that he was not tolerating the mask well, has a history of lung cancer, and did not want further aggressive measures.  Noted that his son has stage IV colon cancer and that he wanted to die before his son did.  Did agree to a trial of lorazepam and BiPAP for sleep if necessary.  By that evening patient had had an increase in confusion and the decision was made to use a trial of sedation with BiPAP.  This was continued through the following day and patient was able to rest well, with intermittent periods of agitation and trying to remove the mask.  Family meeting was held with his children and above discussed, including the patient's recent statement of his wishes.  They wished to try continuous BiPAP with sedation and this was continued through 3/20/2018 and the early morning hours of 3/21/2018.  In the morning of 3/21/2018 blood gases were significantly worse with increased CO2 retention and acidemia despite BiPAP with high pressures, which was being only intermittently tolerated.  Family meeting again held and his children expressed that they were ready to stop BiPAP and allow natural death.  Comfort care orders were written and antibiotics were stopped.  Within a very short time cardiac monitor went from sinus tachycardia in the 140s to atrial flutter with ventricular rate in the 40s.  Patient developed agonal respirations, and absence of apical pulse was noted and death declared at 9:21 AM.    2.  COPD: Oxygen dependent at baseline.  Treated with BiPAP as above, IV Solu-Medrol, inhaled Flovent, and scheduled duo nebs.  Clinical course as above.    3.  Pneumonia of both lower lobes: Chest x-ray on admission showed extensive airspace opacity throughout the right lung and  increased in the left lower lung from previous imaging.  Treated as community-acquired pneumonia with ceftriaxone and azithromycin.  Patient remained afebrile throughout hospitalization.  White blood count was 16.7 on admission, increased to 18.5 on 3/19/2018, and normalized to 9.7 on 3/21/2018.  Patient did not tolerate attempts to repeat chest x-ray.    Cause of death: Respiratory failure on the basis of pneumonia, underlying COPD and recent influenza A    Radha [place]    Significant Results and Procedures   No procedures       Pending Results   Unresulted Labs Ordered in the Past 30 Days of this Admission     Date and Time Order Name Status Description    3/17/2018 1043 Blood culture Preliminary     3/17/2018 1043 Blood culture Preliminary           Primary Care Physician   Jeffery De Paz    Consultations This Hospital Stay   CARE COORDINATOR IP CONSULT  RESPIRATORY CARE IP CONSULT        Data   Most Recent 3 CBC's:  Recent Labs   Lab Test  03/21/18   0412  03/20/18   0408  03/19/18   0416   WBC  9.7  14.7*  18.5*   HGB  12.4*  12.4*  12.6*   MCV  108*  104*  101*   PLT  300  404  383      Most Recent 3 BMP's:  Recent Labs   Lab Test  03/21/18   0412  03/20/18   0408  03/19/18   0416   NA  146*  146*  139   POTASSIUM  5.9*  5.3*  4.6   CHLORIDE  106  106  101   CO2  41*  38*  35*   BUN  39*  36*  34*   CR  0.83  0.68*  0.68*   ANIONGAP  <1*  2*  3   FLETCHER  8.6  8.5*  8.6   GLC  252*  162*  209*     Most Recent 2 LFT's:  Recent Labs   Lab Test  03/17/18   1100  03/06/18   0900   AST  19  18   ALT  22  19   ALKPHOS  68  83   BILITOTAL  0.4  0.7     Most Recent INR's and Anticoagulation Dosing History:  Anticoagulation Dose History     Recent Dosing and Labs Latest Ref Rng & Units 3/6/2018    INR 0 - 1.3 0.98        Most Recent 3 Troponins:  Recent Labs   Lab Test  03/06/18   0900   TROPI  <0.030     Most Recent Cholesterol Panel:  Recent Labs   Lab Test  03/11/13   1518   LDL  127   HDL  76   TRIG  67     Most  Recent 6 Bacteria Isolates From Any Culture (See EPIC Reports for Culture Details):  Recent Labs   Lab Test  03/17/18   1100  03/06/18   0900   CULT  No growth after 4 days  No growth after 4 days  No growth after 6 days  No growth after 6 days     Most Recent TSH, T4 and A1c Labs:No lab results found.  Results for orders placed or performed during the hospital encounter of 03/17/18   XR Chest 2 Views    Narrative    PROCEDURE:  XR CHEST 2 VW    HISTORY: sob; , .    COMPARISON:  3/6/2018    FINDINGS:  The cardiomediastinal contours are unchanged.  The trachea is midline.   There is calcific aortic atherosclerosis.  Extensive ill-defined airspace opacity is present throughout the right  lung and increased in the left lower lung. Chronic nodularity at the  right lung base is obscured by these changes. There is a small right  pleural effusion.    No suspicious osseous lesion or subdiaphragmatic free air.      Impression    IMPRESSION:      Multifocal new ill-defined air space opacities suggest acute  pneumonia.      GUERITA WASHINGTON MD

## 2018-03-21 NOTE — PROGRESS NOTES
[facility]    Hospitalist Progress Note      Assessment & Plan   Yuval Law is a 77 year old male who was admitted on 3/17/2018 with pneumonia following influenza A. Today is hospital day #5. Patient has been sedated and on BiPap continuously since yesterday morning with worsening of ABGs, now with agitation and sinus tachycardia. Family discussion yesterday with the decision to try continuous BiPap for one day, consider comfort care if not responding. Today, family wishes to discontinue aggressive care and transition to comfort cares. This is consistent with patient's previously expressed wishes.    Principal Problem:    Respiratory failure (H)    Assessment: worsening despite continuous Bipap. Sedation has been required to tolerate Bipap. CO2 increasing. Would require intubation for optimal ventilation; has clearly expressed wish not to be intubated, on admission expressed wish to avoid Bipap. Discussed with son and daughter who are in agreement that comfort care at this point is most consistent with patient's wishes.    Plan: comfort care orders done. Transfer to medical floor.  Active Problems:    COPD (chronic obstructive pulmonary disease) (H)    Assessment: as above; worsening despite respiratory support and antibiotics    Plan: as above    Hypertension    Assessment: BP generally in low end of normotensive rante    Plan: as above    Pneumonia of both lower lobes    Assessment: prior CXR showed extensive pneumonia. Ordered CXR not done yesterday because of sedation/BiPap need    Plan: cancel repeat CXR    # Pain Assessment:   Current Pain Score 3/21/2018 3/21/2018 3/21/2018   Patient currently in pain? sleeping: patient not able to self report sleeping: patient not able to self report sleeping: patient not able to self report   Pain score (0-10) - - (No Data)   Pain location - - -   Pain descriptors - - -    - Yuval is unable to participate in a plan for pain management; however,  the plan  was discussed in a collaborative fashion with his family.   - Opioid regimen: morphine prn  - Response to opioid medications: decreased work of breathing  - Bowel regimen: not needed    DVT Prophylaxis: VTE Prophylaxis contraindicated due to end of life care  Code Status: DNR/DNI    Radha Mariana    Interval History   Patient continued on BiPap through night with rate change made on basis of increasing pCO2. Sedation lightened this morning, patient now agitated and appears air hungry with marked sinus tachycardia.     -Data reviewed today: I reviewed all new labs and imaging results over the last 24 hours. I personally reviewed no images or EKG's today.    Physical Exam   Temp: 98.7  F (37.1  C) Temp src: Tympanic BP: 119/79 Pulse: 110 Heart Rate: 166 Resp: 19 SpO2: 91 % O2 Device: BiPAP/CPAP    Vitals:    03/17/18 1033   Weight: 95.7 kg (211 lb)     Vital Signs with Ranges  Temp:  [96.7  F (35.9  C)-98.7  F (37.1  C)] 98.7  F (37.1  C)  Pulse:  [101-110] 110  Heart Rate:  [] 166  Resp:  [11-32] 19  BP: ()/(41-79) 119/79  FiO2 (%):  [45 %-60 %] 45 %  SpO2:  [88 %-97 %] 91 %  I/O last 3 completed shifts:  In: 1518 [I.V.:1518]  Out: -     Constitutional: Restless, moving about in bed, doesn't respond to voice  Respiratory: Diminished throughout with crackles B bases  Cardiovascular: RRR, tachycardic  GI: soft with no apparent tenderness  Skin/Integument: no rash       Medications       docusate sodium  100 mg Oral BID       Data     Recent Labs  Lab 03/21/18  0412 03/20/18  0408 03/19/18  0416  03/17/18  1100   WBC 9.7 14.7* 18.5*  < > 16.7*   HGB 12.4* 12.4* 12.6*  < > 14.4   * 104* 101*  < > 97    404 383  < > 370   * 146* 139  < > 135   POTASSIUM 5.9* 5.3* 4.6  < > 3.7   CHLORIDE 106 106 101  < > 91*   CO2 41* 38* 35*  < > 40*   BUN 39* 36* 34*  < > 14   CR 0.83 0.68* 0.68*  < > 0.74   ANIONGAP <1* 2* 3  < > 4   FLETCHER 8.6 8.5* 8.6  < > 9.1   * 162* 209*  < > 115*    ALBUMIN  --   --   --   --  3.5   PROTTOTAL  --   --   --   --  6.8   BILITOTAL  --   --   --   --  0.4   ALKPHOS  --   --   --   --  68   ALT  --   --   --   --  22   AST  --   --   --   --  19   < > = values in this interval not displayed.     Ref. Range 3/19/2018 04:16 3/20/2018 04:08 3/21/2018 04:12   pH Arterial Latest Ref Range: 7.35 - 7.45 pH 7.31 (L) 7.29 (L) 7.20 (L)   pCO2 Arterial Latest Ref Range: 35 - 45 mm Hg 70 (H) 79 (HH) 119 (HH)   PO2 Arterial Latest Ref Range: 83 - 108 mm Hg 143 (H) 62 (L) 138 (H)   Bicarbonate Arterial Latest Ref Range: 22 - 28 mmol/L 34 (H) 37 (H) 45 (H)   FIO2 Unknown 50 40 60   Oxyhemoglobin Arterial Latest Ref Range: >95 % 98 90 (L) 97

## 2018-03-21 NOTE — PROGRESS NOTES
Pt is currently resting at this time. Pt is chemically sedated to promote sleep and decrease agitation.  No S & S of discomfort and or agitation noted. Pt does open eyes when spoke to. Pt does follow simple commands occasionally such as squeeze my hand . Pt also is cooperative with staff in repositioning and incontinence  care.  Pt is currently on BiPAP and tolerating well at this time.  Mask removed for assessment of skin and oral care and then reapplied. Pt attempted to resist mask being reapplied but when RN verbalized to Pt the need to wear it at this time he no longer attempted to resist it being applied. Pt is being turned and repositioned Q 2 and pillows used for support and elevation of extremities.  Oral care preformed Q 2 while Pt remains on BiPAP.  VSS at this time. See VS flow sheet.  All critical monitoring continues at this time. Pt remains in droplet Isolation.  Fall, elopement , and aspiration  Prevention interventions remain in place. Call light in reach , bed in low locked position and bed alarm activated . Gabriella Sanchez RN on 3/20/2018 at 8:42 PM

## 2018-03-21 NOTE — PROGRESS NOTES
0400 Assessment preformed and completed . No new changes noted from previous assessment  Except see flow sheet. Gabriella Sanchez RN on 3/21/2018 at 9:57 AM

## 2018-03-21 NOTE — PROGRESS NOTES
New orders for comfort cares after Dr. Peña met with family. BiPap removed and 02 applied. Pt assisted with positioning for greatest level of comfort.

## 2018-03-21 NOTE — PROGRESS NOTES
All equipment removed and death cares done. [facility]  called, they state a director will arrive in approx 30 minutes.

## 2018-03-21 NOTE — PROGRESS NOTES
Critical serum CO2  Level of 41  Reported  To this RN by lab.   MD aware and Pt's BiPAP settings adjusted to a set rate of 15 and an FiO2 of 50 % .  This Rn will continue to monitor Pt  And adjust the FiO2 down to 45 %  In 30 minutes if Pt  Maintains adequate SpO2 SATs of 90% or greater.  Will continue to monitor Pt for further changes, and chest X-ray to be obtained this A.M.  and repeat ABG  That will be preformed  At a later time after MD arrives to assess Pt. See flow sheets  RN will document BiPAP changes and monitoring on flow sheets .  Gabriella Sanchez RN on 3/21/2018 at 9:54 AM

## 2018-03-23 LAB
BACTERIA SPEC CULT: NORMAL
BACTERIA SPEC CULT: NORMAL
SPECIMEN SOURCE: NORMAL
SPECIMEN SOURCE: NORMAL

## 2018-04-02 ENCOUNTER — TELEPHONE (OUTPATIENT)
Dept: FAMILY MEDICINE | Facility: OTHER | Age: 78
End: 2018-04-02

## 2018-04-02 NOTE — TELEPHONE ENCOUNTER
Called Tessie at number given, let her know that she needs to schedule an appointment to fill out forms. Transferred to the appt line. Jing Tracey LPN .......................4/2/2018  3:35 PM

## 2018-04-18 NOTE — TELEPHONE ENCOUNTER
Looks like this has already been addressed  Adriane Singh LPN........................4/18/2018  11:19 AM

## 2018-07-23 NOTE — PROGRESS NOTES
Patient Information     Patient Name  Yuval Law MRN  [id number] Sex  Male   1940      Letter by Maria Teresa Keene at      Author:  Maria Teresa Keene Service:  (none) Author Type:  (none)    Filed:   Encounter Date:  2017 Status:  (Other)           Yuval Law  [id number] [address] [address]          May 24, 2017    Dear Mr. Law:    It has come to our attention that you are due for a colonoscopy.  According to our records, your last colonoscopy was done on 07.  It  is time for your repeat colonoscopy.  Please contact the Surgery department at [phone] and we will be happy to set up this colonoscopy for you.  If you have had a colonoscopy since your last one with us, please let us know so we can update our records.      Sincerely,       Maria Teresa Keene  General Surgery      Reviewed and electronically signed by provider.

## 2018-07-23 NOTE — PROGRESS NOTES
Patient Information     Patient Name  Yuval Law MRN  [id number] Sex  Male   1940      Letter by Jeffery De Paz MD at      Author:  Jeffery De Paz MD Service:  (none) Author Type:  (none)    Filed:   Encounter Date:  2017 Status:  (Other)           Yuval Law  [address] [address] [address]          2017    Dear Mr. Law:    This letter is to remind you that you are due for your annual exam with Jeffery De Paz MD. Your last comprehensive visit was more than 12 months ago.    LIMITED refills of       SPIRIVA 18 mcg inhalation capsule      FLUoxetine (SARAFEM) 20 mg tablet      diltiazem (CARDIZEM) 120 mg tablet      meloxicam 15 mg tablet   have been called into your pharmacy. Additional refills require you to complete this appointment.    Please call the clinic at [phone] to schedule your appointment.    If you should require additional refills before your scheduled appointment, please contact your pharmacy and we will refill your medication until the date of your appointment.    If you are no longer seeing Jeffery De Paz MD for primary care, please call to let us know. Doing so will remove you from our call/contact list.    Thank you for choosing [facility] and [facility] for your health care needs.    Sincerely,    Refill RN  [facility]

## 2018-07-24 NOTE — PROGRESS NOTES
Patient Information     Patient Name  Yuval Law MRN  [id number] Sex  Male   1940      Letter by Eddie Torres MD at      Author:  Eddie Torres MD Service:  (none) Author Type:  (none)    Filed:   Encounter Date:  10/10/2017 Status:  (Other)           Yuval Law  [address] [address] [address]          2017    Dear Mr. Law:    These lab results are satisfactory.  Eddie Torres MD ....................  10/12/2017   8:09 AM     Results for orders placed or performed in visit on 10/10/17       COMPLETE METABOLIC PANEL       Result  Value Ref Range Status    SODIUM 141 133 - 143 mmol/L Final    POTASSIUM 4.5 3.5 - 5.1 mmol/L Final    CHLORIDE 96 (L) 98 - 107 mmol/L Final    CO2,TOTAL 27 21 - 31 mmol/L Final    ANION GAP 18 5 - 18                 Final    GLUCOSE 102 70 - 105 mg/dL Final    CALCIUM 9.4 8.6 - 10.3 mg/dL Final    BUN 13 7 - 25 mg/dL Final    CREATININE 0.94 0.70 - 1.30 mg/dL Final    BUN/CREAT RATIO           14                 Final    GFR if African American >60 >60 ml/min/1.73m2 Final    GFR if not African American >60 >60 ml/min/1.73m2 Final    ALBUMIN 4.4 3.5 - 5.7 g/dL Final    PROTEIN,TOTAL 6.2 (L) 6.4 - 8.9 g/dL Final    GLOBULIN                  1.8 (L) 2.0 - 3.7 g/dL Final    A/G RATIO 2.4 (H) 1.0 - 2.0                 Final    BILIRUBIN,TOTAL 0.7 0.3 - 1.0 mg/dL Final    ALK PHOSPHATASE 93 34 - 104 IU/L Final    ALT (SGPT) 21 7 - 52 IU/L Final    AST (SGOT) 23 13 - 39 IU/L Final   CBC WITH AUTO DIFFERENTIAL       Result  Value Ref Range Status    WHITE BLOOD COUNT         8.2 4.5 - 11.0 thou/cu mm Final    RED BLOOD COUNT           4.65 4.30 - 5.90 mil/cu mm Final    HEMOGLOBIN                16.0 13.5 - 17.5 g/dL Final    HEMATOCRIT                46.7 37.0 - 53.0 % Final    MCV                       100 80 - 100 fL Final    MCH                       34.4 (H) 26.0 - 34.0 pg Final    MCHC                      34.3 32.0 - 36.0 g/dL Final    RDW                        12.2 11.5 - 15.5 % Final    PLATELET COUNT            201 140 - 440 thou/cu mm Final    MPV                       8.9 6.5 - 11.0 fL Final    NEUTROPHILS               82.3 (H) 42.0 - 72.0 % Final    LYMPHOCYTES               8.4 (L) 20.0 - 44.0 % Final    MONOCYTES                 7.0 <12.0 % Final    EOSINOPHILS               1.3 <8.0 % Final    BASOPHILS                 0.5 <3.0 % Final    IMMATURE GRANULOCYTES(METAS,MYELOS,PROS) 0.5 % Final    ABSOLUTE NEUTROPHILS      6.8 1.7 - 7.0 thou/cu mm Final    ABSOLUTE LYMPHOCYTES      0.7 (L) 0.9 - 2.9 thou/cu mm Final    ABSOLUTE MONOCYTES        0.6 <0.9 thou/cu mm Final    ABSOLUTE EOSINOPHILS      0.1 <0.5 thou/cu mm Final    ABSOLUTE BASOPHILS        0.0 <0.3 thou/cu mm Final    ABSOLUTE IMMATURE GRANULOCYTES(METAS,MYELOS,PROS) 0.0 <=0.3 thou/cu mm Final

## 2021-10-28 NOTE — ED PROVIDER NOTES
History     Chief Complaint   Patient presents with     Fever     Shortness of Breath     The history is provided by the patient.     Yuval Law is a 77 year old male who has been congested and a little more SOB for the past 2 months.  Today he is running a fever to 101.  Right sided CP since cyberknife surgery and increased recently.  Exposed to granddaughter with URI/?croup but she hasn't had a fever.  Here because of new fever.    Problem List:    Patient Active Problem List    Diagnosis Date Noted     Other chest pain 03/01/2018     Priority: Medium     Hypercholesterolemia 01/25/2018     Priority: Medium     Hypertension 01/25/2018     Priority: Medium     Obesity 01/25/2018     Priority: Medium     Adenocarcinoma of right lung (H) 04/07/2017     Priority: Medium     Calculus of gallbladder 11/15/2016     Priority: Medium     Overview:   S/p CT chest 11/15/2016: Numerous radiodense gallstones.       Benign non-nodular prostatic hyperplasia with lower urinary tract symptoms 03/11/2016     Priority: Medium     Alcohol use 08/22/2015     Priority: Medium     DJD (degenerative joint disease) of knee 07/21/2014     Priority: Medium     Glaucoma 03/13/2014     Priority: Medium     COPD (chronic obstructive pulmonary disease) (H) 11/11/2013     Priority: Medium     Overview:   Diagnosed at age 58       Major depressive disorder, recurrent episode, severe (H) 03/15/2012     Priority: Medium     Tobacco abuse 01/01/1957     Priority: Medium     Overview:   smoked 1-2 ppd; quit at age 58          Past Medical History:    Past Medical History:   Diagnosis Date     Calculus of gallbladder without cholecystitis without obstruction      Chronic obstructive pulmonary disease (H)      Diverticulosis of large intestine without perforation or abscess without bleeding      Enlarged prostate with lower urinary tract symptoms (LUTS)      Essential (primary) hypertension      Fracture of unspecified parts of lumbosacral spine  and pelvis, initial encounter for closed fracture (H)      Glaucoma      Injury of lower leg      Major depressive disorder, 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)      Obesity      Old myocardial infarction      Other specified health status (CODE)      Other specified postprocedural states      Pneumonia      Pneumonia      Polyp of colon      Pure hypercholesterolemia      Sebaceous cyst      Solitary pulmonary nodule      Tobacco use      Umbilical hernia without obstruction or gangrene      Unspecified injury of right wrist, hand and finger(s), initial encounter        Past Surgical History:    Past Surgical History:   Procedure Laterality Date     COLONOSCOPY        and07,next due in 2017     OTHER SURGICAL HISTORY      [id number],OTHER,Right,after saw accident     OTHER SURGICAL HISTORY      23024.0,[place] LASER SURGERY OF EYE,for glaucoma     OTHER SURGICAL HISTORY      [id number],OTHER,on face, 5 yrs ago     OTHER SURGICAL HISTORY      4/10/2014,[id number],OTHER,Left,Dr. Mix     OTHER SURGICAL HISTORY      8/19/15,SUR2,CATARACT EXTRACTION,Left,Dr Andrade     TONSILLECTOMY      No Comments Provided       Family History:    Family History   Problem Relation Age of Onset     CANCER Mother      Cancer, of lung cancer at age 84.     Other - See Comments Father       in World War II at age 45     CANCER Brother      Cancer, of lung cancer     CANCER Brother      Cancer, of lung cancer       Social History:  Marital Status:   [2]  Social History   Substance Use Topics     Smoking status: Former Smoker     Packs/day: 2.00     Types: Cigarettes     Quit date: 1999     Smokeless tobacco: Current User     Types: Chew     Alcohol use Yes      Comment: Alcoholic Drinks/day: occasional        Medications:      oseltamivir (TAMIFLU) 75 MG capsule   Potassium Chloride ER 20 MEQ TBCR   albuterol (2.5 MG/3ML) 0.083% neb solution   albuterol (PROAIR HFA/PROVENTIL HFA/VENTOLIN HFA) 108 (90 BASE)  "MCG/ACT Inhaler   fluticasone (FLOVENT HFA) 220 MCG/ACT Inhaler   glucosamine-chondroitinoitin 750-600 MG TABS   guaiFENesin 400 MG TABS   tiotropium (SPIRIVA) 18 MCG capsule   hydrochlorothiazide (HYDRODIURIL) 25 MG tablet   nebulizer nebulization     Allergies   Allergen Reactions     Bicillin C-R, Hives and Rash     Morphine      Other reaction(s): Emotional Disturbance  Paranoid, \"goofy\" per patient report     Naproxen      Other reaction(s): Headache     Budesonide-Formoterol Fumarate  [Symbicort] Cough     Levofloxacin      Other reaction(s): Edema, Ruptured Tendon  Ligament problems     Lisinopril Hives     Sulfa Drugs      Other reaction(s): *Unknown - Pt Doesn't Remember     Sulfamethoxazole Unknown     Patient does not remember     Sulindac      Other reaction(s): *Unknown - Pt Doesn't Remember     Tadalafil Other (See Comments)     Didn't feel well     Tamsulosin Other (See Comments)     Weakness, nasal/head/chest congestion      Tramadol      Other reaction(s): Insomnia     Dulera      Other reaction(s): Intolerance-Can't Take  Throat irritation       Review of Systems   Constitutional: Positive for fatigue and fever.   HENT: Positive for congestion.    Eyes: Negative.    Respiratory: Positive for cough, shortness of breath and wheezing.    Cardiovascular: Positive for chest pain. Negative for leg swelling.   Gastrointestinal: Negative.    Genitourinary: Negative.        Physical Exam   BP: (!) 159/93  Heart Rate: 122  Temp: 101.1  F (38.4  C)  Resp: 20  Height: 182.9 cm (6')  Weight: 95.7 kg (211 lb)  SpO2: (!) 89 %      Physical Exam   Constitutional: He appears well-developed. He appears distressed.   Oxygen dependent elderly male brought to ED room on wheelchair with O2 at 2L/min.   HENT:   Head: Normocephalic and atraumatic.   Right Ear: External ear normal.   Left Ear: External ear normal.   Nose: Nose normal.   Mouth/Throat: Oropharynx is clear and moist.   Eyes: Conjunctivae and EOM are normal. " Pupils are equal, round, and reactive to light. Right eye exhibits discharge. Left eye exhibits discharge. No scleral icterus.   Mild mattering at lashes bilaterally.   Neck: Normal range of motion. Neck supple. No tracheal deviation present. No thyromegaly present.   Cardiovascular: Regular rhythm and normal heart sounds.    No murmur heard.  Tachycardia.   Pulmonary/Chest: He is in respiratory distress. He has wheezes.   Abdominal: Soft. Bowel sounds are normal. He exhibits no distension. There is no tenderness.   Musculoskeletal: He exhibits no edema.   Lymphadenopathy:     He has no cervical adenopathy.   Neurological: He is alert. He exhibits normal muscle tone.   Skin: Skin is warm and dry. He is not diaphoretic.   Psychiatric: He has a normal mood and affect.   Nursing note and vitals reviewed.      ED Course     ED Course     Procedures               EKG Interpretation:      Interpreted by Kirill Smith  Time reviewed: 8:50 AM  Symptoms at time of EKG: SOB   Rhythm: sinus tachy   Rate: 117  Axis: normal  Ectopy: none  Conduction: normal  ST Segments/ T Waves: No ST-T wave changes  Q Waves: none  Comparison to prior: No old EKG available    Clinical Impression: otherwise normal EKG    Critical Care time:  none               Labs Ordered and Resulted from Time of ED Arrival Up to the Time of Departure from the ED   CBC WITH PLATELETS DIFFERENTIAL - Abnormal; Notable for the following:        Result Value    Absolute Lymphocytes 0.5 (*)     All other components within normal limits   COMPREHENSIVE METABOLIC PANEL - Abnormal; Notable for the following:     Chloride 97 (*)     Glucose 108 (*)     All other components within normal limits   NT PROBNP INPATIENT - Abnormal; Notable for the following:     N-Terminal Pro BNP Inpatient 145 (*)     All other components within normal limits   INFLUENZA A AND B AND RSV PCR - Abnormal; Notable for the following:     Influenza A PCR Positive (*)     All other  components within normal limits   INR   TROPONIN I   BLOOD CULTURE   BLOOD CULTURE       Results for orders placed or performed during the hospital encounter of 03/06/18   XR Chest 2 Views    Narrative    PROCEDURE: XR CHEST 2 VW 3/6/2018 9:18 AM    HISTORY: 2 months of congestion and right sided CP and SOB but new  fever to 101 last night/today.;     COMPARISONS: 3/1/2018.    TECHNIQUE: 2 views.    FINDINGS: Heart and pulmonary vasculature are normal. There is a mass  at the right lung base which contains fiducial markers. This was also  seen previously. There is some linear density adjacent to the right  diaphragm. No new abnormality is seen.         Impression    IMPRESSION: Stable appearance of the chest.    JANE ROBLEDO MD   CBC with platelets differential   Result Value Ref Range    WBC 7.4 4.0 - 11.0 10e9/L    RBC Count 4.83 4.4 - 5.9 10e12/L    Hemoglobin 15.6 13.3 - 17.7 g/dL    Hematocrit 46.3 40.0 - 53.0 %    MCV 96 78 - 100 fl    MCH 32.3 26.5 - 33.0 pg    MCHC 33.7 31.5 - 36.5 g/dL    RDW 13.0 10.0 - 15.0 %    Platelet Count 211 150 - 450 10e9/L    Diff Method Automated Method     % Neutrophils 82.0 %    % Lymphocytes 6.5 %    % Monocytes 10.2 %    % Eosinophils 0.4 %    % Basophils 0.4 %    % Immature Granulocytes 0.5 %    Absolute Neutrophil 6.1 1.6 - 8.3 10e9/L    Absolute Lymphocytes 0.5 (L) 0.8 - 5.3 10e9/L    Absolute Monocytes 0.8 0.0 - 1.3 10e9/L    Absolute Eosinophils 0.0 0.0 - 0.7 10e9/L    Absolute Basophils 0.0 0.0 - 0.2 10e9/L    Abs Immature Granulocytes 0.0 0 - 0.4 10e9/L   INR   Result Value Ref Range    INR 0.98 0 - 1.3   Comprehensive metabolic panel   Result Value Ref Range    Sodium 137 134 - 144 mmol/L    Potassium 4.2 3.5 - 5.1 mmol/L    Chloride 97 (L) 98 - 107 mmol/L    Carbon Dioxide 30 21 - 31 mmol/L    Anion Gap 10 3 - 14 mmol/L    Glucose 108 (H) 70 - 105 mg/dL    Urea Nitrogen 12 7 - 25 mg/dL    Creatinine 0.93 0.70 - 1.30 mg/dL    GFR Estimate 79 >60 mL/min/1.7m2     GFR Estimate If Black >90 >60 mL/min/1.7m2    Calcium 9.4 8.6 - 10.3 mg/dL    Bilirubin Total 0.7 0.3 - 1.0 mg/dL    Albumin 4.3 3.5 - 5.7 g/dL    Protein Total 6.9 6.4 - 8.9 g/dL    Alkaline Phosphatase 83 34 - 104 U/L    ALT 19 7 - 52 U/L    AST 18 13 - 39 U/L   Troponin I   Result Value Ref Range    Troponin I ES <0.030 0.000 - 0.034 ug/L   Nt probnp inpatient (BNP)   Result Value Ref Range    N-Terminal Pro BNP Inpatient 145 (H) 0 - 100 pg/mL   Influenza A and B and RSV PCR   Result Value Ref Range    Specimen Description Nasopharyngeal     Influenza A PCR Positive (A) NEG^Negative    Influenza B PCR Negative NEG^Negative    Resp Syncytial Virus Negative NEG^Negative       Medications   acetaminophen (TYLENOL) tablet 650 mg (not administered)   albuterol neb solution 2.5 mg (2.5 mg Nebulization Given 3/6/18 0855)     9:42 AM recheck with improved sats after neb, and reviewed labs showing normal wbc and +influenza A.  Will start tamiflu and increase flovent to 2 puffs bid x 1 week.    Assessments & Plan (with Medical Decision Making)   77-year-old male with COPD that is oxygen dependent at 2 L/m by NC has been having increased congestion and shortness of breath for 2 months above baseline but today started running a fever. He presents with coryza and his influenza swab is positive for influenza A. His EKG shows no acute ischemic changes and his troponin enzyme is negative. His BNP is minimally elevated and his CBC shows normal white count and hemoglobin.  Kidney function is normal. He'll be started on Tamiflu 75 mg twice a day ×5 days and will increase his Flovent from 1-2 puffs twice a day for the next week.  Discussed that he could get quite sick over the next 5-6 days and to follow up if his breathing is worsening as needed.    I have reviewed the nursing notes.    I have reviewed the findings, diagnosis, plan and need for follow up with the patient.       New Prescriptions    OSELTAMIVIR (TAMIFLU) 75 MG  CAPSULE    Take 1 capsule (75 mg) by mouth 2 times daily for 5 days       Final diagnoses:   Influenza due to influenza virus, type A, human   Panlobular emphysema (H) - Oxygen dependent.       3/6/2018   [facility]  03/06/18 09     Mucosal Advancement Flap Text: Given the location of the defect, shape of the defect and the proximity to free margins a mucosal advancement flap was deemed most appropriate. Incisions were made with a 15 blade scalpel in the appropriate fashion along the cutaneous vermillion border and the mucosal lip. The remaining actinically damaged mucosal tissue was excised.  The mucosal advancement flap was then elevated to the gingival sulcus with care taken to preserve the neurovascular structures and advanced into the primary defect. Care was taken to ensure that precise realignment of the vermillion border was achieved.

## 2023-03-27 NOTE — PROGRESS NOTES
Patient Information     Patient Name MRN Sex Yuval Isaacs [id number] Male 1940      Progress Notes by Davina Byrd R.T. ([facility]) at 2018  1:26 PM     Author:  Davina By[facility] R.T. [facility] Service:  (none) Author Type:  [employer] - Registered Radiologic Technologist     Filed:  2018  1:26 PM Date of Service:  2018  1:26 PM Status:  Signed     :  Davina Byrd R.T. ([facility]) ([facility] - Registered Radiologic Technologist)            Falls Risk Criteria:    Age 65 and older or under age 4        Sensory deficits    Poor vision    Use of ambulatory aides    Impaired judgment    Unable to walk independently    Meets High Risk criteria for falls:  Yes               1.  Do you have dizziness or vertigo?    no                    2.  Do you need help standing or walking?   no                 3.  Have you fallen within the last 6 months?    no           4.  Has the patient been fasting?      yes       If any risks are marked Yes, the following interventions are utilized:    Do not leave patient unattended     Assist patient in the dressing room and bathroom    Have ambulatory aides available throughout procedure    Involve patient s family if available                     Yes...

## 2024-02-23 NOTE — PROGRESS NOTES
"Patient Information     Patient Name MRN Sex Yuval Isaacs [id number] Male 1940      Progress Notes by Jeffery De Paz MD at 2017  9:00 AM     Author:  Jeffery De Paz MD Service:  (none) Author Type:  Physician     Filed:  2017 11:19 AM Encounter Date:  2017 Status:  Signed     :  Jeffery De Paz MD (Physician)            SUBJECTIVE:    Yuval Law is a 77 y.o. male who presents for cold cough    HPI Comments: Patient arrives here for cold and cough. He reports that it's been going on for about 4 days and seems to be getting worse. His cough is productive. Does not associate with any fevers or chills. He reports that he'll be undergoing a preoperative for CyberKnife next week. Patient does have a history of COPD.      Allergies      Allergen   Reactions     Morphine  Emotional Disturbance     Paranoid, \"goofy\" per patient report      Naproxen  Headache     Penicillin G Benzathin,Procain  Hives and Rash     Budesonide-Formoterol  Cough     Cialis [Tadalafil]  Other - Describe In Comment Field     Didn't feel well      Flomax [Tamsulosin]  Other - Describe In Comment Field     Weakness, nasal/head/chest congestion       Levofloxacin  Edema and Ruptured Tendon     Ligament problems      Lisinopril  Hives     Sulfa (Sulfonamide Antibiotics)  *Unknown - Pt Doesn't Remember     Sulindac  *Unknown - Pt Doesn't Remember     Tramadol  Insomnia     Mometasone-Formoterol  Intolerance-Can't Take     Throat irritation      Sulfamethoxazole  *Unknown     Patient does not remember    ,   Family History       Problem   Relation Age of Onset     Cancer  Mother       of lung cancer at age 84.       Other  Father       in World War II at age 45       Cancer  Brother       of lung cancer       Cancer  Brother       of lung cancer      and   Current Outpatient Prescriptions on File Prior to Visit       Medication  Sig Dispense Refill     albuterol HFA 90 mcg/actuation inhaler " Inhale 1-2 Puffs by mouth every 4 hours if needed (Shortness of Breath or Wheezing).       cholecalciferol (VITAMIN D) 1,000 unit tablet Take 1,000 Units by mouth at bedtime.       diltiazem (CARDIZEM) 120 mg tablet Take 1 tablet by mouth two  times daily 180 tablet 2     FLUoxetine (SARAFEM) 20 mg tablet Take 1 tablet by mouth  every morning 90 tablet 2     fluticasone (FLOVENT HFA) 220 mcg/Actuation inhaler Inhale 1 Puff by mouth 2 times daily. 3 Inhaler 4     Gluco Sulfate NaCl-Chond [place] Na (GLUCOSAMINE-CHONDROITIN 3X) 750-600 mg tab Take 1 Tab by mouth 2 times daily.       guaiFENesin 400 mg tablet Take 400 mg by mouth 2 times daily.       hydrochlorothiazide (HCTZ) 25 mg tablet Take 1 tablet by mouth once daily. 90 tablet 3     meloxicam 15 mg tablet Take 1 tablet by mouth once daily 90 tablet 2     Nebulizer Accessories SideStream mouthpiece 1 Kit 0     Nebulizer Nebulizer, PORTABLE, neb kit, neb cup and mask.  Medication: albuterol  For home use. Length of need  for Medicare patients: 99 1 Device 0     PROAIR HFA 90 mcg/actuation inhaler Use 1 to 2 puffs every 4  hours as needed 51 g 0     sennosides-docusate, 8.6-50 mg, (SENOKOT S) 8.6-50 mg tablet Take 1 tablet by mouth 2 times daily. 180 tablet 3     SPIRIVA 18 mcg inhalation capsule Inhale the contents of 1  capsule via [employer]er  daily 90 capsule 2     No current facility-administered medications on file prior to visit.        REVIEW OF SYSTEMS:  ROS    OBJECTIVE:  /74  Pulse 88  Temp 97.9  F (36.6  C) (Oral)  Wt 111.9 kg (246 lb 9.6 oz)  SpO2 93%  BMI 33.44 kg/m2    EXAM:   Physical Exam   Constitutional:   Older than stated age   Cardiovascular: Normal rate and regular rhythm.    Pulmonary/Chest: Effort normal. He has wheezes (occasional). He has rales (occasional).       ASSESSMENT/PLAN:    ICD-10-CM    1. Mucopurulent chronic bronchitis (HC) J41.1 doxycycline (VIBRAMYCIN) 100 mg capsule   2. COPD exacerbation (HC) J44.1 albuterol  (PROVENTIL) 0.083 % neb solution      doxycycline (VIBRAMYCIN) 100 mg capsule   3. Adenocarcinoma of right lung (HC) C34.91         Plan:  Start doxycycline.  Follow-up if getting worse.         22-Feb-2024 21:00

## (undated) RX ORDER — ALBUTEROL SULFATE 0.83 MG/ML
SOLUTION RESPIRATORY (INHALATION)
Status: DISPENSED
Start: 2018-01-01

## (undated) RX ORDER — ACETAMINOPHEN 325 MG/1
TABLET ORAL
Status: DISPENSED
Start: 2018-01-01

## (undated) RX ORDER — CEFTRIAXONE SODIUM 1 G/50ML
INJECTION, SOLUTION INTRAVENOUS
Status: DISPENSED
Start: 2018-01-01

## (undated) RX ORDER — AZITHROMYCIN 500 MG/5ML
INJECTION, POWDER, LYOPHILIZED, FOR SOLUTION INTRAVENOUS
Status: DISPENSED
Start: 2018-01-01

## (undated) RX ORDER — FLUTICASONE PROPIONATE 110 UG/1
AEROSOL, METERED RESPIRATORY (INHALATION)
Status: DISPENSED
Start: 2018-01-01

## (undated) RX ORDER — METHYLPREDNISOLONE SODIUM SUCCINATE 125 MG/2ML
INJECTION, POWDER, LYOPHILIZED, FOR SOLUTION INTRAMUSCULAR; INTRAVENOUS
Status: DISPENSED
Start: 2018-01-01

## (undated) RX ORDER — IPRATROPIUM BROMIDE AND ALBUTEROL SULFATE 2.5; .5 MG/3ML; MG/3ML
SOLUTION RESPIRATORY (INHALATION)
Status: DISPENSED
Start: 2018-01-01